# Patient Record
Sex: FEMALE | Race: BLACK OR AFRICAN AMERICAN | NOT HISPANIC OR LATINO | Employment: FULL TIME | URBAN - METROPOLITAN AREA
[De-identification: names, ages, dates, MRNs, and addresses within clinical notes are randomized per-mention and may not be internally consistent; named-entity substitution may affect disease eponyms.]

---

## 2019-07-07 ENCOUNTER — HOSPITAL ENCOUNTER (EMERGENCY)
Facility: HOSPITAL | Age: 49
Discharge: HOME | End: 2019-07-07
Attending: EMERGENCY MEDICINE
Payer: COMMERCIAL

## 2019-07-07 VITALS
BODY MASS INDEX: 27.32 KG/M2 | OXYGEN SATURATION: 99 % | RESPIRATION RATE: 18 BRPM | WEIGHT: 170 LBS | TEMPERATURE: 98 F | DIASTOLIC BLOOD PRESSURE: 81 MMHG | HEART RATE: 103 BPM | SYSTOLIC BLOOD PRESSURE: 139 MMHG | HEIGHT: 66 IN

## 2019-07-07 DIAGNOSIS — R10.12 LUQ PAIN: Primary | ICD-10-CM

## 2019-07-07 DIAGNOSIS — N63.0 BREAST MASS: ICD-10-CM

## 2019-07-07 LAB
ALBUMIN SERPL BCP-MCNC: 3.8 G/DL (ref 3.5–5.2)
ALP SERPL-CCNC: 131 U/L (ref 55–135)
ALT SERPL W/O P-5'-P-CCNC: 14 U/L (ref 10–44)
ANION GAP SERPL CALC-SCNC: 9 MMOL/L (ref 8–16)
AST SERPL-CCNC: 13 U/L (ref 10–40)
B-HCG UR QL: NEGATIVE
BASOPHILS # BLD AUTO: 0.01 K/UL (ref 0–0.2)
BASOPHILS NFR BLD: 0.2 % (ref 0–1.9)
BILIRUB SERPL-MCNC: 0.3 MG/DL (ref 0.1–1)
BILIRUB UR QL STRIP: NEGATIVE
BUN SERPL-MCNC: 10 MG/DL (ref 6–20)
CALCIUM SERPL-MCNC: 9.8 MG/DL (ref 8.7–10.5)
CHLORIDE SERPL-SCNC: 102 MMOL/L (ref 95–110)
CLARITY UR: CLEAR
CO2 SERPL-SCNC: 29 MMOL/L (ref 23–29)
COLOR UR: YELLOW
CREAT SERPL-MCNC: 0.6 MG/DL (ref 0.5–1.4)
CTP QC/QA: YES
DIFFERENTIAL METHOD: ABNORMAL
EOSINOPHIL # BLD AUTO: 0.3 K/UL (ref 0–0.5)
EOSINOPHIL NFR BLD: 5.2 % (ref 0–8)
ERYTHROCYTE [DISTWIDTH] IN BLOOD BY AUTOMATED COUNT: 13 % (ref 11.5–14.5)
EST. GFR  (AFRICAN AMERICAN): >60 ML/MIN/1.73 M^2
EST. GFR  (NON AFRICAN AMERICAN): >60 ML/MIN/1.73 M^2
GLUCOSE SERPL-MCNC: 93 MG/DL (ref 70–110)
GLUCOSE UR QL STRIP: NEGATIVE
HCT VFR BLD AUTO: 37.7 % (ref 37–48.5)
HGB BLD-MCNC: 11.9 G/DL (ref 12–16)
HGB UR QL STRIP: NEGATIVE
KETONES UR QL STRIP: NEGATIVE
LEUKOCYTE ESTERASE UR QL STRIP: NEGATIVE
LIPASE SERPL-CCNC: 8 U/L (ref 4–60)
LYMPHOCYTES # BLD AUTO: 2.9 K/UL (ref 1–4.8)
LYMPHOCYTES NFR BLD: 48.5 % (ref 18–48)
MCH RBC QN AUTO: 27.1 PG (ref 27–31)
MCHC RBC AUTO-ENTMCNC: 31.6 G/DL (ref 32–36)
MCV RBC AUTO: 86 FL (ref 82–98)
MONOCYTES # BLD AUTO: 0.4 K/UL (ref 0.3–1)
MONOCYTES NFR BLD: 7.2 % (ref 4–15)
NEUTROPHILS # BLD AUTO: 2.3 K/UL (ref 1.8–7.7)
NEUTROPHILS NFR BLD: 39.1 % (ref 38–73)
NITRITE UR QL STRIP: NEGATIVE
PH UR STRIP: 6 [PH] (ref 5–8)
PLATELET # BLD AUTO: 485 K/UL (ref 150–350)
PMV BLD AUTO: 9.1 FL (ref 9.2–12.9)
POTASSIUM SERPL-SCNC: 4.1 MMOL/L (ref 3.5–5.1)
PROT SERPL-MCNC: 8.3 G/DL (ref 6–8.4)
PROT UR QL STRIP: NEGATIVE
RBC # BLD AUTO: 4.39 M/UL (ref 4–5.4)
SODIUM SERPL-SCNC: 140 MMOL/L (ref 136–145)
SP GR UR STRIP: 1.02 (ref 1–1.03)
URN SPEC COLLECT METH UR: NORMAL
UROBILINOGEN UR STRIP-ACNC: NEGATIVE EU/DL
WBC # BLD AUTO: 5.94 K/UL (ref 3.9–12.7)

## 2019-07-07 PROCEDURE — 63600175 PHARM REV CODE 636 W HCPCS: Performed by: NURSE PRACTITIONER

## 2019-07-07 PROCEDURE — 96361 HYDRATE IV INFUSION ADD-ON: CPT

## 2019-07-07 PROCEDURE — 96372 THER/PROPH/DIAG INJ SC/IM: CPT | Mod: 59

## 2019-07-07 PROCEDURE — 99285 EMERGENCY DEPT VISIT HI MDM: CPT | Mod: 25

## 2019-07-07 PROCEDURE — 25500020 PHARM REV CODE 255: Performed by: EMERGENCY MEDICINE

## 2019-07-07 PROCEDURE — 96365 THER/PROPH/DIAG IV INF INIT: CPT

## 2019-07-07 PROCEDURE — 81025 URINE PREGNANCY TEST: CPT | Performed by: EMERGENCY MEDICINE

## 2019-07-07 PROCEDURE — 80053 COMPREHEN METABOLIC PANEL: CPT

## 2019-07-07 PROCEDURE — 83690 ASSAY OF LIPASE: CPT

## 2019-07-07 PROCEDURE — 81003 URINALYSIS AUTO W/O SCOPE: CPT

## 2019-07-07 PROCEDURE — 25000003 PHARM REV CODE 250: Performed by: NURSE PRACTITIONER

## 2019-07-07 PROCEDURE — 85025 COMPLETE CBC W/AUTO DIFF WBC: CPT

## 2019-07-07 PROCEDURE — 96375 TX/PRO/DX INJ NEW DRUG ADDON: CPT

## 2019-07-07 RX ORDER — ONDANSETRON 4 MG/1
4 TABLET, FILM COATED ORAL EVERY 8 HOURS PRN
Qty: 12 TABLET | Refills: 0 | Status: ON HOLD | OUTPATIENT
Start: 2019-07-07 | End: 2023-10-31

## 2019-07-07 RX ORDER — CYCLOBENZAPRINE HCL 5 MG
5 TABLET ORAL 3 TIMES DAILY PRN
Status: ON HOLD | COMMUNITY
End: 2023-10-31

## 2019-07-07 RX ORDER — ONDANSETRON 2 MG/ML
4 INJECTION INTRAMUSCULAR; INTRAVENOUS
Status: COMPLETED | OUTPATIENT
Start: 2019-07-07 | End: 2019-07-07

## 2019-07-07 RX ORDER — BUPRENORPHINE 2 MG/1
2 TABLET SUBLINGUAL DAILY
COMMUNITY
End: 2019-07-07

## 2019-07-07 RX ORDER — BUPRENORPHINE 2 MG/1
8 TABLET SUBLINGUAL DAILY
Status: ON HOLD | COMMUNITY
End: 2023-10-31

## 2019-07-07 RX ORDER — DICYCLOMINE HYDROCHLORIDE 10 MG/ML
20 INJECTION INTRAMUSCULAR
Status: COMPLETED | OUTPATIENT
Start: 2019-07-07 | End: 2019-07-07

## 2019-07-07 RX ORDER — DICYCLOMINE HYDROCHLORIDE 20 MG/1
20 TABLET ORAL
Qty: 28 TABLET | Refills: 0 | Status: SHIPPED | OUTPATIENT
Start: 2019-07-07 | End: 2019-07-14

## 2019-07-07 RX ORDER — ZOLPIDEM TARTRATE 5 MG/1
5 TABLET ORAL NIGHTLY PRN
Status: ON HOLD | COMMUNITY
End: 2023-10-31

## 2019-07-07 RX ADMIN — DICYCLOMINE HYDROCHLORIDE 20 MG: 20 INJECTION, SOLUTION INTRAMUSCULAR at 05:07

## 2019-07-07 RX ADMIN — ONDANSETRON 4 MG: 2 INJECTION INTRAMUSCULAR; INTRAVENOUS at 05:07

## 2019-07-07 RX ADMIN — SODIUM CHLORIDE 1000 ML: 0.9 INJECTION, SOLUTION INTRAVENOUS at 05:07

## 2019-07-07 RX ADMIN — PROMETHAZINE HYDROCHLORIDE 12.5 MG: 25 INJECTION INTRAMUSCULAR; INTRAVENOUS at 07:07

## 2019-07-07 RX ADMIN — IOHEXOL 80 ML: 350 INJECTION, SOLUTION INTRAVENOUS at 07:07

## 2019-07-07 NOTE — ED TRIAGE NOTES
Patient reports intermittent abdominal pain, n/v x several months. States she thinks it is related to medications. Denies fever, diarrhea, constipation, dysuria, hematuria, vaginal bleeding or discharge.

## 2019-07-08 NOTE — ED PROVIDER NOTES
Encounter Date: 2019       History     Chief Complaint   Patient presents with    Abdominal Pain     intermittnet n/v x4 days. generalized abd pain    Emesis     Chief complaint:  Abdominal pain    History of present illness:  Patient is a 48-year-old female reports 4 days of left upper quadrant abdominal pain that is intermittent.  She reports Emetrol and Dramamine have been ineffective for the relief of discomfort.  Current severity pain is 6/10.  She denies fever, chills, diarrhea, vaginal bleeding or discharge, urinary changes.  She endorses nausea and vomiting, constipation with last bowel movement 2 days ago and not notable for blood or bile.    The history is provided by the patient and medical records. No  was used.     Review of patient's allergies indicates:  No Known Allergies  Past Medical History:   Diagnosis Date    Insomnia      Past Surgical History:   Procedure Laterality Date     SECTION       History reviewed. No pertinent family history.  Social History     Tobacco Use    Smoking status: Current Every Day Smoker   Substance Use Topics    Alcohol use: No    Drug use: Not on file     Review of Systems   Constitutional: Negative for chills, fatigue and fever.   HENT: Negative for congestion, ear discharge, ear pain, postnasal drip, rhinorrhea, sinus pressure, sneezing, sore throat and voice change.    Eyes: Negative for discharge and itching.   Respiratory: Negative for cough, shortness of breath and wheezing.    Cardiovascular: Negative for chest pain, palpitations and leg swelling.   Gastrointestinal: Positive for abdominal pain, constipation, nausea and vomiting. Negative for diarrhea.   Endocrine: Negative for polydipsia, polyphagia and polyuria.   Genitourinary: Negative for dysuria, frequency, hematuria, urgency, vaginal bleeding, vaginal discharge and vaginal pain.   Musculoskeletal: Negative for arthralgias and myalgias.   Skin: Negative for rash and  wound.   Neurological: Negative for dizziness, seizures, syncope, weakness and numbness.   Hematological: Negative for adenopathy. Does not bruise/bleed easily.   Psychiatric/Behavioral: Negative for self-injury and suicidal ideas. The patient is not nervous/anxious.        Physical Exam     Initial Vitals [07/07/19 1631]   BP Pulse Resp Temp SpO2   130/80 96 18 98.3 °F (36.8 °C) 98 %      MAP       --         Physical Exam    Nursing note and vitals reviewed.  Constitutional: She appears well-developed and well-nourished.   HENT:   Head: Normocephalic and atraumatic.   Right Ear: External ear normal.   Left Ear: External ear normal.   Nose: Nose normal.   Eyes: Conjunctivae and EOM are normal. Pupils are equal, round, and reactive to light. Right eye exhibits no discharge. Left eye exhibits no discharge.   Neck: Normal range of motion.   Abdominal: Soft. Normal appearance and bowel sounds are normal. She exhibits no distension. There is no tenderness.   Musculoskeletal: Normal range of motion.   Neurological: She is alert and oriented to person, place, and time.   Skin: Skin is dry. Capillary refill takes less than 2 seconds.         ED Course   Procedures  Labs Reviewed   CBC W/ AUTO DIFFERENTIAL - Abnormal; Notable for the following components:       Result Value    Hemoglobin 11.9 (*)     Mean Corpuscular Hemoglobin Conc 31.6 (*)     Platelets 485 (*)     MPV 9.1 (*)     Lymph% 48.5 (*)     All other components within normal limits   COMPREHENSIVE METABOLIC PANEL   URINALYSIS, REFLEX TO URINE CULTURE   LIPASE   POCT URINE PREGNANCY          Imaging Results           CT Abdomen Pelvis With Contrast (Final result)  Result time 07/07/19 19:13:52    Final result by Leia Arroyo MD (07/07/19 19:13:52)                 Impression:      1. No acute intra-abdominal abnormalities identified.  2. Multi fibroid uterus.  3. Small nonobstructing right renal stone.  4. **Left breast 1.6 cm nodular lesion.  Future  mammographic follow-up is recommended. **  This report was flagged in Epic as abnormal.      Electronically signed by: Leia Arroyo MD  Date:    07/07/2019  Time:    19:13             Narrative:    EXAMINATION:  CT ABDOMEN PELVIS WITH CONTRAST    CLINICAL HISTORY:  LLQ pain, suspect diverticulitis;    TECHNIQUE:  Low dose axial images, sagittal and coronal reformations were obtained from the lung bases to the pubic symphysis following the IV administration of 80 mL of Omnipaque 350 .  Oral contrast was not given.    COMPARISON:  None.    FINDINGS:  The visualized portion of the heart is unremarkable.  The lung bases are clear.    No significant hepatic abnormalities are identified.  Liver is mildly enlarged measuring 19 cm.  There is no intra-or extrahepatic biliary ductal dilatation.  Gallbladder is contracted.  The stomach, pancreas, spleen, and adrenal glands are unremarkable.    Kidneys enhance normally with no evidence of hydronephrosis.  Small nonobstructing stone is seen within the right kidney.  No abnormalities are appreciated along the ureteral courses.  Urinary bladder is unremarkable.  Uterus is lobular in contour with multiple suspected fibroids seen.    Appendix is visualized and is unremarkable.  The visualized loops of small and large bowel show no evidence of obstruction or inflammation.  No free air or free fluid.    Aorta tapers normally.    No acute osseous abnormality identified. There is a 1.6 cm soft tissue nodule seen within left breast.                                 Medical Decision Making:   Initial Assessment:   48-year-old female with left lower quadrant abdominal pain, nausea and vomiting and constipation.  Differential Diagnosis:   Small-bowel obstruction, constipation, gastroenteritis, gastroparesis.  ED Management:  Initial orders include: CBC, CMP, Lipase, POCT UPT and UA.  CT Abd pel with iv contrast was also ordered as was bentyl 20mg im, ns1L IV, zofran 4mg ivp.                      ED Course as of Jul 07 2005   Sun Jul 07, 2019   1743 Preg Test, Ur: Negative [VC]   1808 CBC: leukocyte count was normal, the H&H was reduced. The platelet count was increased. This indicates mild anemia.       CBC auto differential(!) [VC]   1848 The chemistry was negative for hypo-or hyper natremia, kalemia, chloridemia, or other electrolyte abnormalities; BUN and creatinine were within normal limits indicating normal kidney function, ALT and AST were within normal limits indicating normal liver function, there was no transaminitis.       Comprehensive metabolic panel [VC]   1848 Lipase was within normal limits indicating unlikely pancreatitis or congestive obstruction of the hepatobiliary tree.     Lipase [VC]   1848 Awaiting for disposition.   Urinalysis, Reflex to Urine Culture [VC]   1926 1. No acute intra-abdominal abnormalities identified.  2. Multi fibroid uterus.  3. Small nonobstructing right renal stone.  4. **Left breast 1.6 cm nodular lesion.  Future mammographic follow-up is recommended. **  This report was flagged in Epic as abnormal.   CT Abdomen Pelvis With Contrast(!) [VC]   1954 UA is negative for infection, no nitrites, leukocytes, blood, or protein present.     Urinalysis, Reflex to Urine Culture [VC]      ED Course User Index  [VC] Truman Guzman DNP     Clinical Impression:       ICD-10-CM ICD-9-CM   1. LUQ pain R10.12 789.02   2. Breast mass N63.0 611.72     Bentyl and zofran prescribed for pain relief.  I doubt sbo.  There may be constipation.  Pt should eat bland diet, f/u with gastro referral provided.  Symptomatic therapies and return precautions on AVS.   Medication choices were made after reviewing allergies, medications, history, available laboratories.     Disposition:   Disposition: Discharged  Condition: Stable                        Truman Guzman DNP  07/07/19 2005

## 2019-08-04 ENCOUNTER — HOSPITAL ENCOUNTER (EMERGENCY)
Facility: HOSPITAL | Age: 49
Discharge: HOME | End: 2019-08-05
Attending: EMERGENCY MEDICINE
Payer: COMMERCIAL

## 2019-08-04 DIAGNOSIS — H81.12 BENIGN PAROXYSMAL POSITIONAL VERTIGO OF LEFT EAR: Primary | ICD-10-CM

## 2019-08-04 LAB
ALBUMIN SERPL-MCNC: 4 G/DL (ref 3.3–5.5)
ALBUMIN SERPL-MCNC: 4.4 G/DL (ref 3.3–5.5)
ALP SERPL-CCNC: 108 U/L (ref 42–141)
ALP SERPL-CCNC: 112 U/L (ref 42–141)
B-HCG UR QL: NEGATIVE
BILIRUB SERPL-MCNC: 0.5 MG/DL (ref 0.2–1.6)
BILIRUB SERPL-MCNC: 0.8 MG/DL (ref 0.2–1.6)
BILIRUBIN, POC UA: NEGATIVE
BLOOD, POC UA: NEGATIVE
BUN SERPL-MCNC: 6 MG/DL (ref 7–22)
CALCIUM SERPL-MCNC: 10.3 MG/DL (ref 8–10.3)
CHLORIDE SERPL-SCNC: 105 MMOL/L (ref 98–108)
CLARITY, POC UA: CLEAR
COLOR, POC UA: ABNORMAL
CREAT SERPL-MCNC: 0.4 MG/DL (ref 0.6–1.2)
CTP QC/QA: YES
GLUCOSE SERPL-MCNC: 119 MG/DL (ref 73–118)
GLUCOSE, POC UA: NEGATIVE
KETONES, POC UA: ABNORMAL
LEUKOCYTE EST, POC UA: NEGATIVE
NITRITE, POC UA: NEGATIVE
PH UR STRIP: 5.5 [PH]
POC ALT (SGPT): 16 U/L (ref 10–47)
POC ALT (SGPT): 19 U/L (ref 10–47)
POC AMYLASE: 29 U/L (ref 14–97)
POC AST (SGOT): 34 U/L (ref 11–38)
POC AST (SGOT): 35 U/L (ref 11–38)
POC GGT: 27 U/L (ref 5–65)
POC TCO2: 24 MMOL/L (ref 18–33)
POTASSIUM BLD-SCNC: 3.9 MMOL/L (ref 3.6–5.1)
PROTEIN, POC UA: ABNORMAL
PROTEIN, POC: 9.1 G/DL (ref 6.4–8.1)
PROTEIN, POC: 9.5 G/DL (ref 6.4–8.1)
SODIUM BLD-SCNC: 142 MMOL/L (ref 128–145)
SPECIFIC GRAVITY, POC UA: >=1.03
UROBILINOGEN, POC UA: 0.2 E.U./DL

## 2019-08-04 PROCEDURE — 63600175 PHARM REV CODE 636 W HCPCS: Mod: ER | Performed by: EMERGENCY MEDICINE

## 2019-08-04 PROCEDURE — 82150 ASSAY OF AMYLASE: CPT | Mod: ER

## 2019-08-04 PROCEDURE — 96361 HYDRATE IV INFUSION ADD-ON: CPT | Mod: ER

## 2019-08-04 PROCEDURE — 96375 TX/PRO/DX INJ NEW DRUG ADDON: CPT | Mod: ER

## 2019-08-04 PROCEDURE — 80053 COMPREHEN METABOLIC PANEL: CPT | Mod: ER

## 2019-08-04 PROCEDURE — 85025 COMPLETE CBC W/AUTO DIFF WBC: CPT | Mod: ER

## 2019-08-04 PROCEDURE — 99284 EMERGENCY DEPT VISIT MOD MDM: CPT | Mod: 25,ER

## 2019-08-04 PROCEDURE — 81025 URINE PREGNANCY TEST: CPT | Mod: ER | Performed by: EMERGENCY MEDICINE

## 2019-08-04 PROCEDURE — P9612 CATHETERIZE FOR URINE SPEC: HCPCS | Mod: ER

## 2019-08-04 PROCEDURE — 81003 URINALYSIS AUTO W/O SCOPE: CPT | Mod: ER

## 2019-08-04 PROCEDURE — 96374 THER/PROPH/DIAG INJ IV PUSH: CPT | Mod: ER

## 2019-08-04 RX ORDER — MECLIZINE HYDROCHLORIDE 25 MG/1
25 TABLET ORAL
Status: COMPLETED | OUTPATIENT
Start: 2019-08-05 | End: 2019-08-05

## 2019-08-04 RX ORDER — ONDANSETRON 2 MG/ML
8 INJECTION INTRAMUSCULAR; INTRAVENOUS
Status: COMPLETED | OUTPATIENT
Start: 2019-08-04 | End: 2019-08-04

## 2019-08-04 RX ORDER — METOCLOPRAMIDE HYDROCHLORIDE 5 MG/ML
10 INJECTION INTRAMUSCULAR; INTRAVENOUS
Status: COMPLETED | OUTPATIENT
Start: 2019-08-04 | End: 2019-08-04

## 2019-08-04 RX ADMIN — SODIUM CHLORIDE 1000 ML: 0.9 INJECTION, SOLUTION INTRAVENOUS at 10:08

## 2019-08-04 RX ADMIN — METOCLOPRAMIDE 10 MG: 5 INJECTION, SOLUTION INTRAMUSCULAR; INTRAVENOUS at 10:08

## 2019-08-04 RX ADMIN — ONDANSETRON 8 MG: 2 INJECTION INTRAMUSCULAR; INTRAVENOUS at 10:08

## 2019-08-04 RX ADMIN — LORAZEPAM 1 MG: 2 INJECTION INTRAMUSCULAR; INTRAVENOUS at 11:08

## 2019-08-05 VITALS
BODY MASS INDEX: 26.84 KG/M2 | TEMPERATURE: 98 F | WEIGHT: 167 LBS | RESPIRATION RATE: 18 BRPM | OXYGEN SATURATION: 100 % | SYSTOLIC BLOOD PRESSURE: 148 MMHG | DIASTOLIC BLOOD PRESSURE: 70 MMHG | HEIGHT: 66 IN | HEART RATE: 98 BPM

## 2019-08-05 PROCEDURE — 25000003 PHARM REV CODE 250: Mod: ER | Performed by: EMERGENCY MEDICINE

## 2019-08-05 RX ORDER — DIAZEPAM 5 MG/1
5 TABLET ORAL EVERY 12 HOURS
Qty: 4 TABLET | Refills: 0 | Status: ON HOLD | OUTPATIENT
Start: 2019-08-05 | End: 2023-11-11 | Stop reason: HOSPADM

## 2019-08-05 RX ORDER — MECLIZINE HYDROCHLORIDE 25 MG/1
25 TABLET ORAL 3 TIMES DAILY PRN
Qty: 20 TABLET | Refills: 0 | Status: ON HOLD | OUTPATIENT
Start: 2019-08-05 | End: 2023-11-11 | Stop reason: HOSPADM

## 2019-08-05 RX ADMIN — MECLIZINE HYDROCHLORIDE 25 MG: 25 TABLET ORAL at 12:08

## 2019-08-05 NOTE — ED PROVIDER NOTES
"Encounter Date: 2019    SCRIBE #1 NOTE: I, Taylor Rogers, am scribing for, and in the presence of,  Dr. Hill. I have scribed the following portions of the note - Other sections scribed: HPI, ROS, PE.       History     Chief Complaint   Patient presents with    Vomiting     pt reports she has had intermittent nausea and vomiting since 930 this morning. pt denies abdominal pain, chest pain, diarrhea or any other associated symptoms     Shellie Atkinson is a 48 y.o. female who presents to the ED complaining of acute intermittent N/V and dizziness since this morning. Pt describes dizziness as a "room spinning sensation" that began suddenly as she leaned her head back. She states symptoms are worse when she looks to the left. She denies focal weakness, gait disturbance, HA, vision disturbance, fever or abdominal pain.     The history is provided by the patient. No  was used.     Review of patient's allergies indicates:  No Known Allergies  Past Medical History:   Diagnosis Date    Insomnia      Past Surgical History:   Procedure Laterality Date     SECTION       History reviewed. No pertinent family history.  Social History     Tobacco Use    Smoking status: Current Every Day Smoker   Substance Use Topics    Alcohol use: No    Drug use: Not on file     Review of Systems   Constitutional: Negative for appetite change and fever.   Eyes: Negative for visual disturbance.   Gastrointestinal: Positive for nausea and vomiting. Negative for abdominal pain, constipation and diarrhea.   Musculoskeletal: Negative for gait problem.   Neurological: Positive for dizziness. Negative for speech difficulty, weakness, light-headedness, numbness and headaches.   All other systems reviewed and are negative.      Physical Exam     Initial Vitals [19 1912]   BP Pulse Resp Temp SpO2   (!) 166/93 94 18 98.6 °F (37 °C) 99 %      MAP       --         Physical Exam    Nursing note and vitals " reviewed.  Constitutional: She appears well-developed and well-nourished.   HENT:   Head: Normocephalic and atraumatic.   Eyes: Conjunctivae are normal.   Neck: Normal range of motion and phonation normal. Neck supple.   Cardiovascular: Normal rate and intact distal pulses.   Pulmonary/Chest: Effort normal. No stridor. No respiratory distress.   Musculoskeletal: Normal range of motion.   Neurological: She is alert and oriented to person, place, and time.   Negative hints exam.   Skin: Skin is warm and dry. Capillary refill takes less than 2 seconds. No rash noted.   Psychiatric: She has a normal mood and affect. Her behavior is normal.         ED Course   Procedures  Labs Reviewed   POCT URINALYSIS W/O SCOPE - Abnormal; Notable for the following components:       Result Value    Glucose, UA Negative (*)     Bilirubin, UA Negative (*)     Ketones, UA Trace (*)     Spec Grav UA >=1.030 (*)     Blood, UA Negative (*)     Protein, UA 1+ (*)     Nitrite, UA Negative (*)     Leukocytes, UA Negative (*)     All other components within normal limits   POCT CMP - Abnormal; Notable for the following components:    POC BUN 6 (*)     POC Creatinine 0.4 (*)     POC Glucose 119 (*)     Protein 9.1 (*)     All other components within normal limits   POCT LIVER PANEL - Abnormal; Notable for the following components:    Protein 9.5 (*)     All other components within normal limits   POCT URINE PREGNANCY   POCT URINALYSIS W/O SCOPE   POCT CBC   POCT CMP   POCT AMYLASE                Imaging Results    None          Medical Decision Making:   Clinical Tests:   Lab Tests: Reviewed and Ordered  ED Management:  12:23 AM Pt feeling much better, N/V has resolved.     Labs Reviewed  Admission on 08/04/2019, Discharged on 08/05/2019   Component Date Value Ref Range Status    POC Preg Test, Ur 08/04/2019 Negative  Negative Final     Acceptable 08/04/2019 Yes   Final    Glucose, UA 08/04/2019 Negative*  Final    Bilirubin,  UA 08/04/2019 Negative*  Final    Ketones, UA 08/04/2019 Trace*  Final    Spec Grav UA 08/04/2019 >=1.030*  Final    Blood, UA 08/04/2019 Negative*  Final    PH, UA 08/04/2019 5.5   Final    Protein, UA 08/04/2019 1+*  Final    Urobilinogen, UA 08/04/2019 0.2  E.U./dL Final    Nitrite, UA 08/04/2019 Negative*  Final    Leukocytes, UA 08/04/2019 Negative*  Final    Color, UA 08/04/2019 Simi   Final    Clarity, UA 08/04/2019 Clear   Final    Albumin, POC 08/04/2019 4.0  3.3 - 5.5 g/dL Final    Alkaline Phosphatase, POC 08/04/2019 112  42 - 141 U/L Final    ALT (SGPT), POC 08/04/2019 19  10 - 47 U/L Final    AST (SGOT), POC 08/04/2019 35  11 - 38 U/L Final    POC BUN 08/04/2019 6* 7 - 22 mg/dL Final    Calcium, POC 08/04/2019 10.3  8 - 10.3 mg/dL Final    POC Chloride 08/04/2019 105  98 - 108 mmol/L Final    POC Creatinine 08/04/2019 0.4* 0.6 - 1.2 mg/dL Final    POC Glucose 08/04/2019 119* 73 - 118 mg/dL Final    POC Potassium 08/04/2019 3.9  3.6 - 5.1 mmol/L Final    POC Sodium 08/04/2019 142  128 - 145 mmol/L Final    Bilirubin 08/04/2019 0.5  0.2 - 1.6 mg/dL Final    POC TCO2 08/04/2019 24  18 - 33 mmol/L Final    Protein 08/04/2019 9.1* 6.4 - 8.1 g/dL Final    Albumin, POC 08/04/2019 4.4  3.3 - 5.5 g/dL Final    Alkaline Phosphatase, POC 08/04/2019 108  42 - 141 U/L Final    ALT (SGPT), POC 08/04/2019 16  10 - 47 U/L Final    Amylase, POC 08/04/2019 29  14 - 97 U/L Final    AST (SGOT), POC 08/04/2019 34  11 - 38 U/L Final    POC GGT 08/04/2019 27  5 - 65 U/L Final    Bilirubin 08/04/2019 0.8  0.2 - 1.6 mg/dL Final    Protein 08/04/2019 9.5* 6.4 - 8.1 g/dL Final        Imaging Reviewed    Imaging Results    None         Medications given in ED    Medications   sodium chloride 0.9% bolus 1,000 mL (0 mLs Intravenous Stopped 8/5/19 0000)   ondansetron injection 8 mg (8 mg Intravenous Given 8/4/19 2205)   metoclopramide HCl injection 10 mg (10 mg Intravenous Given 8/4/19 2223)    lorazepam (ATIVAN) injection 1 mg (1 mg Intravenous Given 8/4/19 8857)   meclizine tablet 25 mg (25 mg Oral Given 8/5/19 0009)       This document was produced by a scribe under my direction and in my presence. I agree with the content of the note and have made any necessary edits.     Ramiro Hill MD         Note was created using voice recognition software. Note may have occasional typographical errors that may not have been identified and edited despite good yaquelin initial review prior to signing.            Scribe Attestation:   Scribe #1: I performed the above scribed service and the documentation accurately describes the services I performed. I attest to the accuracy of the note.      Discharge Medications     Discharge Medication List as of 8/5/2019 12:33 AM      START taking these medications    Details   diazePAM (VALIUM) 5 MG tablet Take 1 tablet (5 mg total) by mouth every 12 (twelve) hours. Do not drive while taking this medication for 2 days, Starting Mon 8/5/2019, Until Wed 8/7/2019, Print      meclizine (ANTIVERT) 25 mg tablet Take 1 tablet (25 mg total) by mouth 3 (three) times daily as needed for Dizziness or Nausea., Starting Mon 8/5/2019, Normal         CONTINUE these medications which have NOT CHANGED    Details   cyclobenzaprine (FLEXERIL) 5 MG tablet Take 5 mg by mouth 3 (three) times daily as needed for Muscle spasms., Historical Med      zolpidem (AMBIEN) 5 MG Tab Take 5 mg by mouth nightly as needed., Historical Med      buprenorphine HCl (SUBUTEX) 2 mg Subl Place 8 mg under the tongue once daily. 2/3 tab, Historical Med      ondansetron (ZOFRAN) 4 MG tablet Take 1 tablet (4 mg total) by mouth every 8 (eight) hours as needed for Nausea., Starting Sun 7/7/2019, Print                   Patient discharged to home in stable condition with instructions to:   1. Please take all meds as prescribed.  2. Follow-up with your primary care doctor   3. Return precautions discussed and patient and/or  family/caretaker understands to return to the emergency room for any concerns including worsening of your current symptoms, fever, chills, night sweats, worsening pain, chest pain, shortness of breath, nausea, vomiting, diarrhea, bleeding, headache, difficulty talking, visual disturbances, weakness, numbness or any other acute concerns             Clinical Impression:     1. Benign paroxysmal positional vertigo of left ear            Disposition:   Disposition: Discharged  Condition: Stable                        Ramiro Hill MD  08/09/19 8522

## 2019-08-05 NOTE — DISCHARGE INSTRUCTIONS
Watch instructional video on how to perform the Epley maneuver.  Repeat Epley maneuver as needed for recurrent vertigo.

## 2023-01-17 ENCOUNTER — HOSPITAL ENCOUNTER (EMERGENCY)
Facility: HOSPITAL | Age: 53
Discharge: STILL ADMITTED OR ADMINISTRATIVE | End: 2023-01-18
Attending: STUDENT IN AN ORGANIZED HEALTH CARE EDUCATION/TRAINING PROGRAM

## 2023-01-17 DIAGNOSIS — F22 PARANOIA: Primary | ICD-10-CM

## 2023-01-17 DIAGNOSIS — N30.01 ACUTE CYSTITIS WITH HEMATURIA: ICD-10-CM

## 2023-01-17 LAB
ALBUMIN SERPL BCP-MCNC: 4 G/DL (ref 3.5–5.2)
ALP SERPL-CCNC: 88 U/L (ref 55–135)
ALT SERPL W/O P-5'-P-CCNC: 11 U/L (ref 10–44)
AMPHET+METHAMPHET UR QL: NEGATIVE
ANION GAP SERPL CALC-SCNC: 8 MMOL/L (ref 8–16)
APAP SERPL-MCNC: <3 UG/ML (ref 10–20)
AST SERPL-CCNC: 13 U/L (ref 10–40)
B-HCG UR QL: NEGATIVE
BACTERIA #/AREA URNS HPF: ABNORMAL /HPF
BARBITURATES UR QL SCN>200 NG/ML: NEGATIVE
BASOPHILS # BLD AUTO: 0.05 K/UL (ref 0–0.2)
BASOPHILS NFR BLD: 0.7 % (ref 0–1.9)
BENZODIAZ UR QL SCN>200 NG/ML: NEGATIVE
BILIRUB SERPL-MCNC: 0.5 MG/DL (ref 0.1–1)
BILIRUB UR QL STRIP: NEGATIVE
BUN SERPL-MCNC: 7 MG/DL (ref 6–20)
BZE UR QL SCN: NEGATIVE
CALCIUM SERPL-MCNC: 10 MG/DL (ref 8.7–10.5)
CANNABINOIDS UR QL SCN: NEGATIVE
CHLORIDE SERPL-SCNC: 108 MMOL/L (ref 95–110)
CLARITY UR: ABNORMAL
CO2 SERPL-SCNC: 26 MMOL/L (ref 23–29)
COLOR UR: YELLOW
CREAT SERPL-MCNC: 0.8 MG/DL (ref 0.5–1.4)
CREAT UR-MCNC: 344.2 MG/DL (ref 15–325)
CTP QC/QA: YES
CTP QC/QA: YES
DIFFERENTIAL METHOD: NORMAL
EOSINOPHIL # BLD AUTO: 0.3 K/UL (ref 0–0.5)
EOSINOPHIL NFR BLD: 3.7 % (ref 0–8)
ERYTHROCYTE [DISTWIDTH] IN BLOOD BY AUTOMATED COUNT: 12.3 % (ref 11.5–14.5)
EST. GFR  (NO RACE VARIABLE): >60 ML/MIN/1.73 M^2
ETHANOL SERPL-MCNC: <10 MG/DL
GLUCOSE SERPL-MCNC: 112 MG/DL (ref 70–110)
GLUCOSE UR QL STRIP: NEGATIVE
HCT VFR BLD AUTO: 40.1 % (ref 37–48.5)
HGB BLD-MCNC: 13.1 G/DL (ref 12–16)
HGB UR QL STRIP: ABNORMAL
HYALINE CASTS #/AREA URNS LPF: ABNORMAL /LPF
IMM GRANULOCYTES # BLD AUTO: 0.01 K/UL (ref 0–0.04)
IMM GRANULOCYTES NFR BLD AUTO: 0.1 % (ref 0–0.5)
KETONES UR QL STRIP: NEGATIVE
LEUKOCYTE ESTERASE UR QL STRIP: ABNORMAL
LYMPHOCYTES # BLD AUTO: 3 K/UL (ref 1–4.8)
LYMPHOCYTES NFR BLD: 43.6 % (ref 18–48)
MCH RBC QN AUTO: 29.3 PG (ref 27–31)
MCHC RBC AUTO-ENTMCNC: 32.7 G/DL (ref 32–36)
MCV RBC AUTO: 90 FL (ref 82–98)
METHADONE UR QL SCN>300 NG/ML: NEGATIVE
MICROSCOPIC COMMENT: ABNORMAL
MONOCYTES # BLD AUTO: 0.5 K/UL (ref 0.3–1)
MONOCYTES NFR BLD: 6.5 % (ref 4–15)
NEUTROPHILS # BLD AUTO: 3.2 K/UL (ref 1.8–7.7)
NEUTROPHILS NFR BLD: 45.4 % (ref 38–73)
NITRITE UR QL STRIP: NEGATIVE
NRBC BLD-RTO: 0 /100 WBC
OPIATES UR QL SCN: NEGATIVE
PCP UR QL SCN>25 NG/ML: NEGATIVE
PH UR STRIP: 6 [PH] (ref 5–8)
PLATELET # BLD AUTO: 441 K/UL (ref 150–450)
PMV BLD AUTO: 9.5 FL (ref 9.2–12.9)
POTASSIUM SERPL-SCNC: 4.4 MMOL/L (ref 3.5–5.1)
PROT SERPL-MCNC: 8.7 G/DL (ref 6–8.4)
PROT UR QL STRIP: ABNORMAL
RBC # BLD AUTO: 4.47 M/UL (ref 4–5.4)
RBC #/AREA URNS HPF: 21 /HPF (ref 0–4)
SARS-COV-2 RDRP RESP QL NAA+PROBE: NEGATIVE
SODIUM SERPL-SCNC: 142 MMOL/L (ref 136–145)
SP GR UR STRIP: 1.02 (ref 1–1.03)
SQUAMOUS #/AREA URNS HPF: 9 /HPF
T4 FREE SERPL-MCNC: 1.42 NG/DL (ref 0.71–1.51)
TOXICOLOGY INFORMATION: ABNORMAL
TSH SERPL DL<=0.005 MIU/L-ACNC: 0.01 UIU/ML (ref 0.4–4)
URN SPEC COLLECT METH UR: ABNORMAL
UROBILINOGEN UR STRIP-ACNC: NEGATIVE EU/DL
WBC # BLD AUTO: 6.97 K/UL (ref 3.9–12.7)
WBC #/AREA URNS HPF: 18 /HPF (ref 0–5)

## 2023-01-17 PROCEDURE — 81000 URINALYSIS NONAUTO W/SCOPE: CPT | Mod: 59 | Performed by: EMERGENCY MEDICINE

## 2023-01-17 PROCEDURE — 80307 DRUG TEST PRSMV CHEM ANLYZR: CPT | Performed by: EMERGENCY MEDICINE

## 2023-01-17 PROCEDURE — 87635 SARS-COV-2 COVID-19 AMP PRB: CPT | Performed by: EMERGENCY MEDICINE

## 2023-01-17 PROCEDURE — G0425 INPT/ED TELECONSULT30: HCPCS | Mod: GT,,, | Performed by: PSYCHIATRY & NEUROLOGY

## 2023-01-17 PROCEDURE — 80053 COMPREHEN METABOLIC PANEL: CPT | Performed by: EMERGENCY MEDICINE

## 2023-01-17 PROCEDURE — 82077 ASSAY SPEC XCP UR&BREATH IA: CPT | Performed by: EMERGENCY MEDICINE

## 2023-01-17 PROCEDURE — G0425 PR INPT TELEHEALTH CONSULT 30M: ICD-10-PCS | Mod: GT,,, | Performed by: PSYCHIATRY & NEUROLOGY

## 2023-01-17 PROCEDURE — 84439 ASSAY OF FREE THYROXINE: CPT | Performed by: EMERGENCY MEDICINE

## 2023-01-17 PROCEDURE — 84443 ASSAY THYROID STIM HORMONE: CPT | Performed by: EMERGENCY MEDICINE

## 2023-01-17 PROCEDURE — 80143 DRUG ASSAY ACETAMINOPHEN: CPT | Performed by: EMERGENCY MEDICINE

## 2023-01-17 PROCEDURE — 85025 COMPLETE CBC W/AUTO DIFF WBC: CPT | Performed by: EMERGENCY MEDICINE

## 2023-01-17 PROCEDURE — 81025 URINE PREGNANCY TEST: CPT | Performed by: EMERGENCY MEDICINE

## 2023-01-17 PROCEDURE — 99285 EMERGENCY DEPT VISIT HI MDM: CPT | Mod: 25

## 2023-01-17 PROCEDURE — 87086 URINE CULTURE/COLONY COUNT: CPT | Performed by: EMERGENCY MEDICINE

## 2023-01-17 RX ORDER — NITROFURANTOIN 25; 75 MG/1; MG/1
100 CAPSULE ORAL EVERY 12 HOURS
Status: DISCONTINUED | OUTPATIENT
Start: 2023-01-18 | End: 2023-01-18

## 2023-01-17 NOTE — ED PROVIDER NOTES
"Encounter Date: 2023    SCRIBE #1 NOTE: I, Clinton West, am scribing for, and in the presence of,  Addie Zavala DO.     History     Chief Complaint   Patient presents with    Psychiatric Evaluation     Pt here for a psych eval .  Pt reports being stalked by an apostle x 1 month.  Denies SI, but would hurt the person stalking her.       51 y/o female with no PMHx was brought to ED by police for a psychiatric evaluation following domestic disturbances that the patient has caused over the past 2 weeks. Police note other recent bizarre behaviors, such as knocking on all her neighbors doors. Patient states that she is being stalked by kathie Li from [employer] in [place], which she previously attended; she believes he is staying in her building and this resulted in the previously mentioned domestic disturbances. She has called police for the stalking several times. Patient endorses homicidal thoughts directed at the stalker; she also endorses recent stress at work, resulting in her quitting. Patient also states her phone, e-mail, bank accounts have been hacked and she does not "trust" [employer]. She denies hallucinations or suicidal thoughts. Patient denies tobacco, EtOH, or drug use. No known allergies.     The history is provided by the patient and the police. No  was used.   Review of patient's allergies indicates:  No Known Allergies  Past Medical History:   Diagnosis Date    Insomnia      Past Surgical History:   Procedure Laterality Date     SECTION       No family history on file.  Social History     Tobacco Use    Smoking status: Every Day   Substance Use Topics    Alcohol use: No     Review of Systems   Constitutional:  Negative for chills and fever.   HENT:  Negative for drooling, facial swelling and trouble swallowing.    Eyes:  Negative for redness and visual disturbance.   Respiratory:  Negative for cough, shortness of breath and stridor.  "   Cardiovascular:  Negative for chest pain.   Gastrointestinal:  Negative for abdominal pain, nausea and vomiting.   Genitourinary:  Negative for dysuria and hematuria.   Musculoskeletal:  Negative for gait problem and neck stiffness.   Skin:  Negative for pallor and wound.   Neurological:  Negative for syncope, facial asymmetry, speech difficulty and weakness.   Psychiatric/Behavioral:  Positive for agitation and behavioral problems. Negative for confusion, hallucinations, self-injury and suicidal ideas.         (+) Homicidal thoughts   All other systems reviewed and are negative.    Physical Exam     Initial Vitals [01/17/23 1741]   BP Pulse Resp Temp SpO2   (!) 143/90 86 18 98.7 °F (37.1 °C) 100 %      MAP       --         Physical Exam    Nursing note and vitals reviewed.  Constitutional: She appears well-developed and well-nourished. She is not diaphoretic. No distress.   HENT:   Head: Normocephalic and atraumatic.   Right Ear: External ear normal.   Left Ear: External ear normal.   Nose: Nose normal.   Eyes: Conjunctivae are normal. No scleral icterus.   Neck: Neck supple. No tracheal deviation present.   Normal range of motion.  Cardiovascular:  Normal rate, regular rhythm and normal heart sounds.           Pulmonary/Chest: Breath sounds normal. No respiratory distress. She has no wheezes. She has no rhonchi. She has no rales.   Abdominal: Abdomen is soft. Bowel sounds are normal. There is no abdominal tenderness.   Musculoskeletal:      Cervical back: Normal range of motion and neck supple.     Neurological: She is alert and oriented to person, place, and time.   Skin: Skin is warm and dry.   Psychiatric: She has a normal mood and affect. Thought content is paranoid. She expresses homicidal ideation.       ED Course   Procedures  Labs Reviewed   COMPREHENSIVE METABOLIC PANEL - Abnormal; Notable for the following components:       Result Value    Glucose 112 (*)     Total Protein 8.7 (*)     All other  components within normal limits   TSH - Abnormal; Notable for the following components:    TSH 0.012 (*)     All other components within normal limits   ACETAMINOPHEN LEVEL - Abnormal; Notable for the following components:    Acetaminophen (Tylenol), Serum <3.0 (*)     All other components within normal limits   CBC W/ AUTO DIFFERENTIAL   ALCOHOL,MEDICAL (ETHANOL)   T4, FREE   URINALYSIS, REFLEX TO URINE CULTURE   DRUG SCREEN PANEL, URINE EMERGENCY   SARS-COV-2 RDRP GENE   POCT URINE PREGNANCY          Imaging Results    None          Medications - No data to display  Medical Decision Making:   Clinical Tests:   Lab Tests: Ordered and Reviewed  ED Management:   [facility]  This is an emergent evaluation of a 52 y.o. female with no significant past medical history presents with paranoid behavior. Initial vitals in the ED  [01/17/23 1741]    BP: (!) 143/90  Pulse: 86  Resp: 18  Temp: 98.7 °F (37.1 °C)  SpO2: 100 % .    Physical exam noted above. DDx includes but is not limited to acute psychosis, substance induced psychosis, electrolyte abnormality, thyroid disorder, acute infection. Also considered but clinically less likely to be stroke, meningitis, acute intracranial hemorrhage. Will obtain labs and imaging including  CBC, CMP, TSH, urine drug screen, UA, Tylenol level, ethanol level. Will also provide tele psych consult. Will continue to monitor and frequently reassess pending results of labs, treatments and final disposition.     Patient is aware of plan and is amenable.     Addie Zavala D.O  EMERGENCY MEDICINE  6:11 PM 01/17/2023    UPDATE:  Labs reveal a decreased TSH with normal free T4.  Remainder of labs unremarkable.  UA and urine drug screen pending.  Patient was evaluated by tele psychiatry, Dr. Saba, who recommended that patient be PEC for grave disability and placed in an inpatient psychiatric facility.  Patient is aware of plan.  Will plan to transport to psychiatric facility once patient  medically clear.  Will sign patient out to Dr. Cowan pending medical clearance.  This chart was completed using dictation software, as a result there may be some transcription errors          Scribe Attestation:   Scribe #1: I performed the above scribed service and the documentation accurately describes the services I performed. I attest to the accuracy of the note.            I, Addie Zavala DO , personally performed the services described in this documentation.  All medical record entries made by the scribe were at my direction and in my presence.  I have reviewed the chart and agree that the record reflects my personal performance and is accurate and complete.         Clinical Impression:   Final diagnoses:  [F22] Paranoia (Primary)        ED Disposition Condition    Transfer to Psych Facility Stable          ED Prescriptions    None       Follow-up Information    None          Addie Zavala DO  01/17/23 3652

## 2023-01-18 VITALS
RESPIRATION RATE: 18 BRPM | WEIGHT: 160 LBS | BODY MASS INDEX: 25.82 KG/M2 | HEART RATE: 84 BPM | DIASTOLIC BLOOD PRESSURE: 81 MMHG | TEMPERATURE: 98 F | OXYGEN SATURATION: 98 % | SYSTOLIC BLOOD PRESSURE: 135 MMHG

## 2023-01-18 PROCEDURE — 25000003 PHARM REV CODE 250: Performed by: INTERNAL MEDICINE

## 2023-01-18 RX ORDER — NITROFURANTOIN 25; 75 MG/1; MG/1
100 CAPSULE ORAL EVERY 12 HOURS
Status: DISCONTINUED | OUTPATIENT
Start: 2023-01-18 | End: 2023-01-18 | Stop reason: HOSPADM

## 2023-01-18 RX ADMIN — NITROFURANTOIN (MONOHYDRATE/MACROCRYSTALS) 100 MG: 75; 25 CAPSULE ORAL at 04:01

## 2023-01-18 NOTE — ED NOTES
Pt transferred to [facility] at this time. This RN called receiving unit to notify of pt's ETA to arrive.

## 2023-01-18 NOTE — ED NOTES
Pec faxed and scanned into patients chart. Registration scanned pec into patients chart. Spoke with Ahsan at the coroners office to notify him of pec.

## 2023-01-18 NOTE — CONSULTS
[facility]  Psychiatry  Telepsychiatry Consult Note    Please see previous notes:    Patient agreeable to consultation via telepsychiatry.    Tele-Consultation from Psychiatry started: 1/17/2023 at 6:00 PM  The chief complaint leading to psychiatric consultation is: Delusions  This consultation was requested by Dr. Cruz, the Emergency Department attending physician.  The location of the consulting psychiatrist is [place], [place].  The patient location is  [facility] EMERGENCY DEPARTMENT   The patient arrived at the ED at: 5:57 PM    Also present with the patient at the time of the consultation: Nursing staff    Patient Identification:   Shellie Atkinson is a 52 y.o. female.    Patient information was obtained from patient.  Patient presented involuntarily to the Emergency Department by police    Inpatient consult to Telemedicine - Psychiatry  Consult performed by: Shayne Saba DO  Consult ordered by: Celestino Gutiérrez MD      Teleconsult Time Documentation  Subjective:     History of Present Illness:  Per ED DO:   Psychiatric Evaluation        Pt here for a psych eval .  Pt reports being stalked by an apostle x 1 month.  Denies SI, but would hurt the person stalking her.        51 y/o female with no PMHx was brought to ED by police for a psychiatric evaluation following domestic disturbances that the patient has caused over the past 2 weeks. Police note other recent bizarre behaviors, such as knocking on all her neighbors doors. Patient states that she is being stalked by Pastor Radha Li from [employer] in [place], which she previously attended; she believes he is staying in her building and this resulted in the previously mentioned domestic disturbances. She has called police for the stalking several times. Patient endorses homicidal thoughts directed at the stalker; she also endorses recent stress at work, resulting in her quitting. Patient also states her phone,  "e-mail, bank accounts have been hacked and she does not "trust" WiFi. She denies hallucinations or suicidal thoughts. Patient denies tobacco, EtOH, or drug use. No known allergies.     On Interview:  Patient seen through teleconference this evening on my approach. She reports that she was brought to the hospital earlier today by the police. The police were concerned about the patient's behavior because she has been knocking on her neighbors doors. She states that she was knocking on their doors because she believed that a  that was stalking her was in the house. She has knocked on this particular door 3-4 times and she mentions doing this because she feels like he is accessing her wifi and finding different ways to interfere with her life. She is also concerned that he has been trying to access her bank account.     Psychiatric History:   Previous Psychiatric Hospitalizations: No  Previous Medication Trials: Yes   Previous Suicide Attempts: no   History of Violence: No  History of Depression: Yes  History of Domonique: No  History of Auditory/Visual Hallucination No  History of Delusions: Unclear  Outpatient psychiatrist (current & past): No    Substance Abuse History:  Tobacco:No  Alcohol: No  Illicit Substances:No  Detox/Rehab: No    Legal History: Past charges/incarcerations: Yes over 25 years ago    Family Psychiatric History: No      Social History:  Developmental/Childhood:Achieved all developmental milestones timely  Education: Some college  Employment Status/Finances:Unemployed within the past month  Relationship Status/Sexual Orientation:   Children: 2  Housing Status: Home alone   history:  NO  Access to gun: NO  Jew:Actively participates in organized Mormon  Recreational activities: Writing  Patient was born and raised in [place]     Psychiatric Mental Status Exam:  Arousal: alert  Sensorium/Orientation: oriented to grossly intact  Behavior/Cooperation: cooperative   Speech: " normal tone, normal rate, normal pitch, normal volume  Language: grossly intact  Mood: good   Affect: strange  Thought Process: ilogical  Thought Content:   Auditory hallucinations: NO  Visual hallucinations: NO  Paranoia: YES:      Delusions:  YES:      Suicidal ideation: NO  Homicidal ideation: NO  Attention/Concentration:  intact  Memory:    Recent:  Intact   Remote: Intact  Fund of Knowledge: Aware of current events   Abstract reasoning: proverbs were abstract, similarities were abstract  Insight: poor awareness of illness  Judgment: Poor      Past Medical History:   Past Medical History:   Diagnosis Date    Insomnia       Laboratory Data:   Labs Reviewed   COMPREHENSIVE METABOLIC PANEL - Abnormal; Notable for the following components:       Result Value    Glucose 112 (*)     Total Protein 8.7 (*)     All other components within normal limits   ACETAMINOPHEN LEVEL - Abnormal; Notable for the following components:    Acetaminophen (Tylenol), Serum <3.0 (*)     All other components within normal limits   CBC W/ AUTO DIFFERENTIAL   TSH   URINALYSIS, REFLEX TO URINE CULTURE   DRUG SCREEN PANEL, URINE EMERGENCY   ALCOHOL,MEDICAL (ETHANOL)   SARS-COV-2 RDRP GENE   POCT URINE PREGNANCY       Neurological History:  Seizures: No  Head trauma: No    Allergies:   Review of patient's allergies indicates:  No Known Allergies    Medications in ER: Medications - No data to display    Medications at home:     No new subjective & objective note has been filed under this hospital service since the last note was generated.      Assessment - Diagnosis - Goals:     Diagnosis/Impression: Patient is a 52 year old woman with no documented past psychiatric history who presented to the ED with bizarre delusions that a  has been stalking her. The patient has been harassing her neighbors and going to their homes assuming the  is in the house. The police have been called multiple times about this and brought the patient to the  hospital today because of the complaint.    Rec: Recommend [facility] as the patient is currently a gravely disabled secondary to psychiatric illness. Recommend involuntary placement in an inpatient psychiatric facility once the patient is medically stable.      Time with patient: 20 minutes      More than 50% of the time was spent counseling/coordinating care    Consulting clinician was informed of the encounter and consult note.    Consultation ended: 1/17/2023 at 6:20 PM    Shayne Saba,    Psychiatry  [facility]

## 2023-01-18 NOTE — ED NOTES
Handoff report taken from NATANAEL Lima. Assumed care at this time. Awaiting transport for pt to receiving facility.

## 2023-01-18 NOTE — ED TRIAGE NOTES
Pt BIB [facility] police, with [facility] for bizarre behavior. Per police pt has been paranoid and banging on neighbors doors. Upon arrival on scene, per police, pt was able to be redirected and remained calm and cooperative to ED. Per police pt was stating her neighbor is stalking her. Upon arrival to ED, pt states her neighbor is stalking and is trying to hack her phone, emails and bank account. Pt states that she is frustrated with him. Pt states that her neighbor, who is her ex  has been coming to her former job trying to get her to re-join the [employer]. Pt believes her neighbor is attempting to access her wifi because she see's wifi on her phone. Pt denies SI/HI/AH/VH at present. Pt calm and cooperative and is willing to speak with psychiatrist. Dr. Cruz at bedside. Sitter and security at bedside. Will continue to monitor.

## 2023-01-18 NOTE — ED NOTES
[facility]. [facility] notified of pt transfer, spoke with Charlotte

## 2023-01-19 LAB — BACTERIA UR CULT: NO GROWTH

## 2023-10-30 ENCOUNTER — HOSPITAL ENCOUNTER (EMERGENCY)
Facility: HOSPITAL | Age: 53
Discharge: PSYCHIATRIC OR SUBSTANCE USE FACILITY | End: 2023-10-31
Attending: EMERGENCY MEDICINE
Payer: MEDICAID

## 2023-10-30 DIAGNOSIS — E87.6 HYPOKALEMIA: ICD-10-CM

## 2023-10-30 DIAGNOSIS — F22 DELUSIONS: ICD-10-CM

## 2023-10-30 DIAGNOSIS — F22 PARANOIA: Primary | ICD-10-CM

## 2023-10-30 LAB
ALBUMIN SERPL BCP-MCNC: 3.7 G/DL (ref 3.5–5.2)
ALP SERPL-CCNC: 88 U/L (ref 55–135)
ALT SERPL W/O P-5'-P-CCNC: 8 U/L (ref 10–44)
AMPHET+METHAMPHET UR QL: NEGATIVE
ANION GAP SERPL CALC-SCNC: 12 MMOL/L (ref 8–16)
AST SERPL-CCNC: 11 U/L (ref 10–40)
BACTERIA #/AREA URNS HPF: ABNORMAL /HPF
BARBITURATES UR QL SCN>200 NG/ML: NEGATIVE
BASOPHILS # BLD AUTO: 0.05 K/UL (ref 0–0.2)
BASOPHILS NFR BLD: 0.8 % (ref 0–1.9)
BENZODIAZ UR QL SCN>200 NG/ML: NEGATIVE
BILIRUB SERPL-MCNC: 0.5 MG/DL (ref 0.1–1)
BILIRUB UR QL STRIP: NEGATIVE
BUN SERPL-MCNC: 8 MG/DL (ref 6–20)
BZE UR QL SCN: NEGATIVE
CALCIUM SERPL-MCNC: 9.4 MG/DL (ref 8.7–10.5)
CANNABINOIDS UR QL SCN: NEGATIVE
CHLORIDE SERPL-SCNC: 109 MMOL/L (ref 95–110)
CLARITY UR: ABNORMAL
CO2 SERPL-SCNC: 24 MMOL/L (ref 23–29)
COLOR UR: YELLOW
CREAT SERPL-MCNC: 0.7 MG/DL (ref 0.5–1.4)
CREAT UR-MCNC: 155.5 MG/DL (ref 15–325)
DIFFERENTIAL METHOD: ABNORMAL
EOSINOPHIL # BLD AUTO: 0.3 K/UL (ref 0–0.5)
EOSINOPHIL NFR BLD: 3.8 % (ref 0–8)
ERYTHROCYTE [DISTWIDTH] IN BLOOD BY AUTOMATED COUNT: 13.2 % (ref 11.5–14.5)
EST. GFR  (NO RACE VARIABLE): >60 ML/MIN/1.73 M^2
ETHANOL SERPL-MCNC: <10 MG/DL
GLUCOSE SERPL-MCNC: 109 MG/DL (ref 70–110)
GLUCOSE UR QL STRIP: NEGATIVE
HCT VFR BLD AUTO: 37.4 % (ref 37–48.5)
HGB BLD-MCNC: 12.3 G/DL (ref 12–16)
HGB UR QL STRIP: ABNORMAL
IMM GRANULOCYTES # BLD AUTO: 0.01 K/UL (ref 0–0.04)
IMM GRANULOCYTES NFR BLD AUTO: 0.2 % (ref 0–0.5)
KETONES UR QL STRIP: NEGATIVE
LEUKOCYTE ESTERASE UR QL STRIP: ABNORMAL
LYMPHOCYTES # BLD AUTO: 3.3 K/UL (ref 1–4.8)
LYMPHOCYTES NFR BLD: 50.2 % (ref 18–48)
MCH RBC QN AUTO: 29.4 PG (ref 27–31)
MCHC RBC AUTO-ENTMCNC: 32.9 G/DL (ref 32–36)
MCV RBC AUTO: 89 FL (ref 82–98)
METHADONE UR QL SCN>300 NG/ML: NEGATIVE
MICROSCOPIC COMMENT: ABNORMAL
MONOCYTES # BLD AUTO: 0.5 K/UL (ref 0.3–1)
MONOCYTES NFR BLD: 7.3 % (ref 4–15)
NEUTROPHILS # BLD AUTO: 2.5 K/UL (ref 1.8–7.7)
NEUTROPHILS NFR BLD: 37.7 % (ref 38–73)
NITRITE UR QL STRIP: NEGATIVE
NRBC BLD-RTO: 0 /100 WBC
OPIATES UR QL SCN: NEGATIVE
PCP UR QL SCN>25 NG/ML: NEGATIVE
PH UR STRIP: 6 [PH] (ref 5–8)
PLATELET # BLD AUTO: 433 K/UL (ref 150–450)
PMV BLD AUTO: 9 FL (ref 9.2–12.9)
POTASSIUM SERPL-SCNC: 3.2 MMOL/L (ref 3.5–5.1)
PROT SERPL-MCNC: 7.7 G/DL (ref 6–8.4)
PROT UR QL STRIP: NEGATIVE
RBC # BLD AUTO: 4.19 M/UL (ref 4–5.4)
RBC #/AREA URNS HPF: 6 /HPF (ref 0–4)
SODIUM SERPL-SCNC: 145 MMOL/L (ref 136–145)
SP GR UR STRIP: 1.01 (ref 1–1.03)
SQUAMOUS #/AREA URNS HPF: 7 /HPF
TOXICOLOGY INFORMATION: NORMAL
URN SPEC COLLECT METH UR: ABNORMAL
UROBILINOGEN UR STRIP-ACNC: NEGATIVE EU/DL
WBC # BLD AUTO: 6.59 K/UL (ref 3.9–12.7)
WBC #/AREA URNS HPF: 26 /HPF (ref 0–5)

## 2023-10-30 PROCEDURE — 84439 ASSAY OF FREE THYROXINE: CPT | Performed by: EMERGENCY MEDICINE

## 2023-10-30 PROCEDURE — 80143 DRUG ASSAY ACETAMINOPHEN: CPT | Performed by: EMERGENCY MEDICINE

## 2023-10-30 PROCEDURE — 85025 COMPLETE CBC W/AUTO DIFF WBC: CPT | Performed by: EMERGENCY MEDICINE

## 2023-10-30 PROCEDURE — 80307 DRUG TEST PRSMV CHEM ANLYZR: CPT | Performed by: EMERGENCY MEDICINE

## 2023-10-30 PROCEDURE — 84443 ASSAY THYROID STIM HORMONE: CPT | Performed by: EMERGENCY MEDICINE

## 2023-10-30 PROCEDURE — 99285 EMERGENCY DEPT VISIT HI MDM: CPT

## 2023-10-30 PROCEDURE — 81000 URINALYSIS NONAUTO W/SCOPE: CPT | Performed by: EMERGENCY MEDICINE

## 2023-10-30 PROCEDURE — 87086 URINE CULTURE/COLONY COUNT: CPT | Performed by: EMERGENCY MEDICINE

## 2023-10-30 PROCEDURE — 80053 COMPREHEN METABOLIC PANEL: CPT | Performed by: EMERGENCY MEDICINE

## 2023-10-30 PROCEDURE — 82077 ASSAY SPEC XCP UR&BREATH IA: CPT | Performed by: EMERGENCY MEDICINE

## 2023-10-31 ENCOUNTER — HOSPITAL ENCOUNTER (INPATIENT)
Facility: HOSPITAL | Age: 53
LOS: 11 days | Discharge: HOME | DRG: 885 | End: 2023-11-11
Attending: PSYCHIATRY & NEUROLOGY | Admitting: PSYCHIATRY & NEUROLOGY
Payer: MEDICAID

## 2023-10-31 VITALS
TEMPERATURE: 99 F | WEIGHT: 156 LBS | RESPIRATION RATE: 18 BRPM | HEIGHT: 66 IN | BODY MASS INDEX: 25.07 KG/M2 | HEART RATE: 68 BPM | OXYGEN SATURATION: 100 % | DIASTOLIC BLOOD PRESSURE: 76 MMHG | SYSTOLIC BLOOD PRESSURE: 165 MMHG

## 2023-10-31 DIAGNOSIS — F22 PARANOID: Primary | ICD-10-CM

## 2023-10-31 LAB
APAP SERPL-MCNC: <3 UG/ML (ref 10–20)
B-HCG UR QL: NEGATIVE
CTP QC/QA: YES
T4 FREE SERPL-MCNC: 1.25 NG/DL (ref 0.71–1.51)
TSH SERPL DL<=0.005 MIU/L-ACNC: 0.32 UIU/ML (ref 0.4–4)

## 2023-10-31 PROCEDURE — S4991 NICOTINE PATCH NONLEGEND: HCPCS | Performed by: PSYCHIATRY & NEUROLOGY

## 2023-10-31 PROCEDURE — 11400000 HC PSYCH PRIVATE ROOM

## 2023-10-31 PROCEDURE — 81025 URINE PREGNANCY TEST: CPT | Performed by: EMERGENCY MEDICINE

## 2023-10-31 PROCEDURE — 99205 PR OFFICE/OUTPT VISIT, NEW, LEVL V, 60-74 MIN: ICD-10-PCS | Mod: AF,HB,95, | Performed by: PSYCHIATRY & NEUROLOGY

## 2023-10-31 PROCEDURE — 99205 OFFICE O/P NEW HI 60 MIN: CPT | Mod: AF,HB,95, | Performed by: PSYCHIATRY & NEUROLOGY

## 2023-10-31 PROCEDURE — 90833 PSYTX W PT W E/M 30 MIN: CPT | Mod: AF,HB,, | Performed by: PSYCHIATRY & NEUROLOGY

## 2023-10-31 PROCEDURE — 25000003 PHARM REV CODE 250: Performed by: PSYCHIATRY & NEUROLOGY

## 2023-10-31 PROCEDURE — 99222 PR INITIAL HOSPITAL CARE,LEVL II: ICD-10-PCS | Mod: AF,HB,, | Performed by: PSYCHIATRY & NEUROLOGY

## 2023-10-31 PROCEDURE — 25000003 PHARM REV CODE 250: Performed by: EMERGENCY MEDICINE

## 2023-10-31 PROCEDURE — 99222 1ST HOSP IP/OBS MODERATE 55: CPT | Mod: AF,HB,, | Performed by: PSYCHIATRY & NEUROLOGY

## 2023-10-31 PROCEDURE — 90833 PR PSYCHOTHERAPY W/PATIENT W/E&M, 30 MIN (ADD ON): ICD-10-PCS | Mod: AF,HB,, | Performed by: PSYCHIATRY & NEUROLOGY

## 2023-10-31 RX ORDER — OLANZAPINE 10 MG/2ML
10 INJECTION, POWDER, FOR SOLUTION INTRAMUSCULAR EVERY 8 HOURS PRN
Status: CANCELLED | OUTPATIENT
Start: 2023-10-31

## 2023-10-31 RX ORDER — HYDROXYZINE PAMOATE 50 MG/1
50 CAPSULE ORAL EVERY 6 HOURS PRN
Status: DISCONTINUED | OUTPATIENT
Start: 2023-10-31 | End: 2023-11-11 | Stop reason: HOSPADM

## 2023-10-31 RX ORDER — OLANZAPINE 10 MG/1
10 TABLET ORAL EVERY 8 HOURS PRN
Status: CANCELLED | OUTPATIENT
Start: 2023-10-31

## 2023-10-31 RX ORDER — HYDROXYZINE PAMOATE 50 MG/1
50 CAPSULE ORAL EVERY 6 HOURS PRN
Status: CANCELLED | OUTPATIENT
Start: 2023-10-31

## 2023-10-31 RX ORDER — POTASSIUM CHLORIDE 20 MEQ/1
40 TABLET, EXTENDED RELEASE ORAL
Status: COMPLETED | OUTPATIENT
Start: 2023-10-31 | End: 2023-10-31

## 2023-10-31 RX ORDER — LOPERAMIDE HYDROCHLORIDE 2 MG/1
2 CAPSULE ORAL
Status: CANCELLED | OUTPATIENT
Start: 2023-10-31

## 2023-10-31 RX ORDER — OLANZAPINE 10 MG/2ML
10 INJECTION, POWDER, FOR SOLUTION INTRAMUSCULAR EVERY 8 HOURS PRN
Status: DISCONTINUED | OUTPATIENT
Start: 2023-10-31 | End: 2023-11-11 | Stop reason: HOSPADM

## 2023-10-31 RX ORDER — IBUPROFEN 200 MG
1 TABLET ORAL DAILY PRN
Status: DISCONTINUED | OUTPATIENT
Start: 2023-10-31 | End: 2023-11-01

## 2023-10-31 RX ORDER — ACETAMINOPHEN 325 MG/1
650 TABLET ORAL EVERY 6 HOURS PRN
Status: CANCELLED | OUTPATIENT
Start: 2023-10-31

## 2023-10-31 RX ORDER — IBUPROFEN 200 MG
1 TABLET ORAL DAILY PRN
Status: CANCELLED | OUTPATIENT
Start: 2023-10-31

## 2023-10-31 RX ORDER — LOPERAMIDE HYDROCHLORIDE 2 MG/1
2 CAPSULE ORAL
Status: DISCONTINUED | OUTPATIENT
Start: 2023-10-31 | End: 2023-11-11 | Stop reason: HOSPADM

## 2023-10-31 RX ORDER — ONDANSETRON 4 MG/1
4 TABLET, ORALLY DISINTEGRATING ORAL EVERY 8 HOURS PRN
Status: CANCELLED | OUTPATIENT
Start: 2023-10-31

## 2023-10-31 RX ORDER — BENZONATATE 100 MG/1
100 CAPSULE ORAL 3 TIMES DAILY PRN
Status: DISCONTINUED | OUTPATIENT
Start: 2023-10-31 | End: 2023-11-11 | Stop reason: HOSPADM

## 2023-10-31 RX ORDER — MAG HYDROX/ALUMINUM HYD/SIMETH 200-200-20
30 SUSPENSION, ORAL (FINAL DOSE FORM) ORAL EVERY 6 HOURS PRN
Status: CANCELLED | OUTPATIENT
Start: 2023-10-31

## 2023-10-31 RX ORDER — ONDANSETRON 4 MG/1
4 TABLET, ORALLY DISINTEGRATING ORAL EVERY 8 HOURS PRN
Status: DISCONTINUED | OUTPATIENT
Start: 2023-10-31 | End: 2023-11-11 | Stop reason: HOSPADM

## 2023-10-31 RX ORDER — MAG HYDROX/ALUMINUM HYD/SIMETH 200-200-20
30 SUSPENSION, ORAL (FINAL DOSE FORM) ORAL EVERY 6 HOURS PRN
Status: DISCONTINUED | OUTPATIENT
Start: 2023-10-31 | End: 2023-11-11 | Stop reason: HOSPADM

## 2023-10-31 RX ORDER — BENZTROPINE MESYLATE 1 MG/ML
2 INJECTION, SOLUTION INTRAMUSCULAR; INTRAVENOUS EVERY 8 HOURS PRN
Status: DISCONTINUED | OUTPATIENT
Start: 2023-10-31 | End: 2023-11-11 | Stop reason: HOSPADM

## 2023-10-31 RX ORDER — PROMETHAZINE HYDROCHLORIDE 25 MG/1
25 TABLET ORAL EVERY 6 HOURS PRN
Status: DISCONTINUED | OUTPATIENT
Start: 2023-10-31 | End: 2023-11-11 | Stop reason: HOSPADM

## 2023-10-31 RX ORDER — ACETAMINOPHEN 325 MG/1
650 TABLET ORAL EVERY 6 HOURS PRN
Status: DISCONTINUED | OUTPATIENT
Start: 2023-10-31 | End: 2023-11-11 | Stop reason: HOSPADM

## 2023-10-31 RX ORDER — OLANZAPINE 10 MG/1
10 TABLET ORAL EVERY 8 HOURS PRN
Status: DISCONTINUED | OUTPATIENT
Start: 2023-10-31 | End: 2023-11-11 | Stop reason: HOSPADM

## 2023-10-31 RX ADMIN — POTASSIUM CHLORIDE 40 MEQ: 1500 TABLET, EXTENDED RELEASE ORAL at 12:10

## 2023-10-31 RX ADMIN — NICOTINE 1 PATCH: 14 PATCH, EXTENDED RELEASE TRANSDERMAL at 01:10

## 2023-10-31 NOTE — NURSING
52 y.o. black  female with a PMHx of paranoia, insomnia, who presents to the ED via police under [facility] for psychiatric evaluation. Patient comes under [facility] file out by her son. History provided by independent historian, patient's son, who reports patient has been stating that her neighbors and store owners have been doing Jehovah's witness on her and are out to get her. He further endorses she reported they are turning off her lights and wifi. He further endorses she has not been taking her psychiatric medication since she was last discharged for her psychiatric admission a year ago. He notes she has also has not been eating properly. Patient currently denies this, and reports she is concerned because her banking information was stolen. No alleviating or exacerbating factors noted. NKDA  Pt arrived to [facility] with [facility] at side. Pt was transported here per [facility].  Proscan performed per security prior to pt's arrival to unit with negative results.  Pt oriented to unit and unit routine.  Pt's belongings given to mht to be inventoried.  Pt served a lunch tray and consumed 75 percent. Admission paperwork explained to pt and signed.  See in chart.  No c/o si, hi or a v hallucinations.  Pt gravely disabled.  Pt has been noncompliant with meds for over a year  and told psychiatrist that she would rather not take meds at this time.  Pt aware that prn meds are readily available if needed.  Pt verbalized understanding.  Pt instructed to call for  any needs or concerns at all.  Verbalized understanding.  Will cont to monitor for safety.

## 2023-10-31 NOTE — ED NOTES
Assumed care of patient. Introduced self to patient. Patient verbalizes understanding of POC. Tearful but cooperative. Denies needs at this time. Sitter at bedside for 1:1 observation. Care ongoing.

## 2023-10-31 NOTE — ED NOTES
Spoke with Kim at  's Office to inform her of [facility] patient in Room ED 15. Paperwork has been scanned into system. [facility] has been faxed over to [facility] Office.

## 2023-10-31 NOTE — ED NOTES
Pt BIB [facility] officers w/[facility] for psych eval. Per [facility] placed by family, states pt has been having delusions that an old coworker has been placing Sikhism curses on her, has not been taking her medications for over 1 year, placed foil all over the vents in the house to block spirits, believes her Wifi is being hacked. Pt denies all of these accusations. States she has not said any of those things pt does admit she has not taken any medication since February. Denies SI/HI/AV/HV. Pt is calm and cooperative at this time. Aaox3, nadn. Resp eu

## 2023-10-31 NOTE — CONSULTS
RD received consult for new admit. Spoke with PCT and PCT stated pt has no dietary issues at this time. RD to sign off. Please consult if any nutrition/dietary issues arise.

## 2023-10-31 NOTE — H&P
"PSYCHIATRY INPATIENT ADMISSION NOTE - H & P      10/31/2023 1:15 PM   Shellie Atkinson   1970   [id number]         DATE OF ADMISSION: 10/31/2023  1:05 PM    SITE: [facility]    CURRENT LEGAL STATUS: PEC and/or CEC      HISTORY    CHIEF COMPLAINT   Shellie Atkinson is a 52 y.o. female with a past psychiatric history of psychosis and insomnia currently admitted to the inpatient unit with the following chief complaint: psychosis, "I'm not sure why I'm here."    HPI   The patient was seen and examined. The chart was reviewed.    The patient presented to the ER on 10/31/2023 . Per staff notes:  -Pt BIB [employer] w/[facility] for psych eval. Per [facility] placed by family, states pt has been having delusions that an old coworker has been placing Confucianism curses on her, has not been taking her medications for over 1 year, placed foil all over the vents in the house to block spirits, believes her Wifi is being hacked. Pt denies all of these accusations. States she has not said any of those things pt does admit she has not taken any medication since February. Denies SI/HI/AV/HV. Pt is calm and cooperative at this time  -Pt BIB [employer] [employer] with [facility] for psych eval, Pt has not been taking her medications or eating, not taking care of herself and unwilling to discuss with family  -Shellie Atkinson is a 52 y.o. female with a PMHx of paranoia, insomnia, who presents to the ED via police under [facility]PC for psychiatric evaluation. Patient comes under [facility]PC file out by her son. History provided by independent historian, patient's son, who reports patient has been stating that her neighbors and store owners have been doing Confucianism on her and are out to get her. He further endorses she reported they are turning off her lights and wifi. He further endorses she has not been taking her psychiatric medication since she was last discharged for her psychiatric admission a year ago. He notes she has also has not been eating properly. " "Patient currently denies this, and reports she is concerned because her banking information was stolen. No alleviating or exacerbating factors noted. NKDA.   -Patient is a 52yoF with unknown past psychiatric history who presented to the ED on OPC by son for paranoia. This morning there were difficulties with internet and the lights. Patient says that she was worried that her cell phone was hacked. Patient thinks that her battery drains too fast because she has spyware. Patient says that her accounts are compromised so she cannot apply for disability. Patient denies that she has covered. Patient does think in spiritual warfare. Patient is concerned that she could be labeled. Patient says she worked with [facility] [employer] team in the past. Patient says that they just want to be free of her. Patient says she does not take any medication. She sleeps ok.    Patient endorses "extremely happy and blessed" mood and endorses good sleep and appetite. Patient denies any sober period of sleep deficit associated with grandiosity/irritability, distractibility, impulsivity, racing thoughts, increased activity and talkativeness. The patient denies any current or history of SI/HI or any plan/intent to self-harm or harm others. Denies AH/VH, paranoia. No vocalized delusions.   Collateral:  Leandra Lambert, son  [phone]  Patient had moved in with her son October 10, 2022. Mother lost her housing; had an apartment in 's name. Since October 2022, patient has been living with son. Son knew that patient has been supposed to take medication. Patient has been lying to son. Patient has not been taking medication. Patient has been talking about someone she worked with in the past. She was saying that the woman put Restoration on her. Son has recordings of the conversations. Son travels for work so he does not see what is happening. Patient is not acting right. Patient has not been eating; patient said that she had been fasting. Patient " "blamed the refrigerator going out on the coworker, either Avis or Evangelina. Accuses the lady of following patient from other medication. This is to another extreme that she is pressed against this woman. Thinks the lights are being powered by the lady. Patient thinks the  is a warlock that interacts with her, too. Accusing the lady of spitting on the windows that was actually a splash of paint. Thinks she is fighting a force that is going against her will. Patient has been isolating herself. Son says that he has recordings.    The patient was medically cleared and admitted to the U.    The patient reports that she was having some stress over suspicious activity on her phone, "I woke up and they sent me here." She denied all symptoms as documented below; however, there are concerns that she is minimizing symptoms. She discussed narratives concerning for paranoid delusions.    She previously had a similar episode in 1/2023- she was admitted to a U and placed on medication. She is not currently on any medications.      Symptoms of Depression: diminished mood - No, loss of interest/anhedonia - No;  recurrent - No, >14 days - No, diminished energy - No, change in sleep - No, change in appetite - No, diminished concentration or cognition or indecisiveness - No, PMA/R -  No, excessive guilt or hopelessness or worthlessness - No, suicidal ideations - No    Changes in Sleep: trouble with initiation- No, maintenance, - No early morning awakening with inability to return to sleep - No, hypersomnolence - No    Suicidal- active/passive ideations - No, organized plans, future intentions - No    Homicidal ideations: active/passive ideations - No, organized plans, future intentions - No    Symptoms of psychosis: hallucinations - No, delusions - No, disorganized speech - No, disorganized behavior or abnormal motor behavior - No, or negative symptoms (diminshed emotional expression, avolition, anhedonia, alogia, asociality) " "- No, active phase symptoms >1 month - No, continuous signs of illness > 6 months - No, since onset of illness decreased level of functioning present - No    Symptoms of oswaldo or hypomania: elevated, expansive, or irritable mood with increased energy or activity - No; > 4 days - No,  >7 days - No; with inflated self-esteem or grandiosity - No, decreased need for sleep - No, increased rate of speech - No, FOI or racing thoughts - No, distractibility - No, increased goal directed activity or PMA - No, risky/disinhibited behavior - No    Symptoms of SHARMIN: excessive anxiety/worry/fear, more days than not, about numerous issues - No, ongoing for >6 months - No, difficult to control - No, with restlessness - No, fatigue - No, poor concentration - No, irritability - No, muscle tension - No, sleep disturbance - No; causes functionally impairing distress - No    Symptoms of Panic Disorder: recurrent panic attacks (palpitations/heart racing, sweating, shakiness, dyspnea, choking, chest pain/discomfort, Gi symptoms, dizzy/lightheadedness, hot/col flashes, paresthesias, derealization, fear of losing control or fear of dying or fear of "going crazy") - No, precipitated - No, un-precipitated - No, source of worry and/or behavioral changes secondary for 1 month or longer- No, agoraphobia - No    Symptoms of PTSD: h/o trauma exposure - No; re-experiencing/intrusive symptoms - No, avoidant behavior - No, 2 or more negative alterations in cognition or mood - No, 2 or more hyperarousal symptoms - No; with dissociative symptoms - No, ongoing for 1 or more  months - No    Symptoms of OCD: obsessions (recurrent thoughts/urges/images; intrusive and/or unwanted; uses other thoughts/actions to suppress) - No; compulsions (repetitive behaviors used to lower distress/anxiety/obsessions) - No, time-consuming (over 1 hour per day) or cause significant distress/impairment - - No    Symptoms of Anorexia: restriction of caloric intake leading to " significantly low body weight - No, intense fear of gaining weight or persistent behavior that interferes with weight gain even thought at a significantly low weight - No, disturbance in the way in which one's body weight or shape is experienced, undue influence of body weight or shape on self evaluation, or persistent lack of recognition of the seriousness of the current low body weight - No    Symptoms of Bulimia: recurrent episodes of binge eating (definitely larger amount  than what others would eat and lack of a sense of control over eating during episode) - No, recurrent inappropriate compensatory behaviors in order to prevent weight gain (fasting, medications, exercise, vomiting) - No, binges and compensatory behaviors both occur on average at least once a week for 3 months - No, self evaluations is unduly influenced by body shape/weight- - No    Symptoms of Binge eating: recurrent episodes of binge eating (definitely larger amount than what others would eat and lack of a sense of control over eating during episode) - No, 3 or more of following (eating much more rapidly, eating until uncomfortably full, large amounts when not hungry, eating alone because of embarrassed by how much,  feeling disgusted with oneself, depressed or very guilty afterward) - No, distress regarding binges - No, binges occur on average at least once a week for 3 months - No      Substance/s:  Taken in larger amounts or over longer periods than intended: No,  Persistent desire or unsuccessful attempts to cut down or stop: No,  Great deal of time spent seeking, using or recovering from: No,  Craving or strong desire to use: No,  Recurrent use despite failure to meet major role obligation: No,  Continued use despite persistent or recurrent social/interparsonal issues due to use: No,  Important social/work/recreational activities given up due to use: No,  Recurrent use in physically hazardous situations: No,  Continued use despite  knowledge of persistent physical or psychological problem: No,  Tolerance (either increased need or diminished effect): No,  UDS was negative;  was reviewed- no controlled substances prescribed       Psychotherapy:  Target symptoms: psychosis  Why chosen therapy is appropriate versus another modality: relevant to diagnosis, patient responds to this modality, evidence based practice  Outcome monitoring methods: self-report, observation  Therapeutic intervention type: insight oriented psychotherapy, behavior modifying psychotherapy, supportive psychotherapy, interactive psychotherapy  Topics discussed/themes: building skills sets for symptom management, symptom recognition  The patient's response to the intervention is reluctant. The patient's progress toward treatment goals is limited.   Duration of intervention: 16 minutes.      PAST PSYCHIATRIC HISTORY  Previous Psychiatric Hospitalizations: Yes  Previous SI/HI: No,  Previous Suicide Attempts: No,   Previous Medication Trials: Yes,  Psychiatric Care (current & past): Yes, no outpatient provider  History of Psychotherapy: No,  History of Violence: No,  History of sexual/physical abuse: No,    PAST MEDICAL & SURGICAL HISTORY   Past Medical History:   Diagnosis Date    Insomnia      Past Surgical History:   Procedure Laterality Date     SECTION       denied    CURRENT PSYCH MEDICATION REGIMEN   unknown  Current Medication side effects:  u/a  Current Medication compliance:  u/a    Previous psych meds trials  Yes- as below    Home Meds:   Prior to Admission medications    Medication Sig Start Date End Date Taking? Authorizing Provider   buprenorphine HCl (SUBUTEX) 2 mg Subl Place 8 mg under the tongue once daily. 2/3 tab    Provider, Historical   cyclobenzaprine (FLEXERIL) 5 MG tablet Take 5 mg by mouth 3 (three) times daily as needed for Muscle spasms.    Provider, Historical   diazePAM (VALIUM) 5 MG tablet Take 1 tablet (5 mg total) by mouth every 12  (twelve) hours. Do not drive while taking this medication for 2 days 8/5/19 8/7/19  Ramiro Hill MD   meclizine (ANTIVERT) 25 mg tablet Take 1 tablet (25 mg total) by mouth 3 (three) times daily as needed for Dizziness or Nausea. 8/5/19   Ramiro Hill MD   ondansetron (ZOFRAN) 4 MG tablet Take 1 tablet (4 mg total) by mouth every 8 (eight) hours as needed for Nausea. 7/7/19   Truman Guzman DNP   zolpidem (AMBIEN) 5 MG Tab Take 5 mg by mouth nightly as needed.    Provider, Historical         OTC Meds: none    Scheduled Meds:    PRN Meds:    Psychotherapeutics (From admission, onward)      None            ALLERGIES   Review of patient's allergies indicates:  No Known Allergies    NEUROLOGIC HISTORY  Seizures: No  Head trauma: No    SOCIAL HISTORY:  Developmental/Childhood:Achieved all developmental milestones timely; Patient was born and raised in [place]   Education:High School Diploma- some college  Employment Status/Finances:Unemployed   Relationship Status/Sexual Orientation:   Children: 2  Housing Status: Home-  with son and his girlfriend since February 2023   history:  NO   Access to Firearms: NO ;  Locked up? n/a  Latter-day:Actively participates in organized Orthodoxy  Recreational activities: writing     SUBSTANCE ABUSE HISTORY   Recreational Drugs:  denied    Use of Alcohol: denied  Rehab History:no   Tobacco Use:yes- vapes    LEGAL HISTORY:   Past charges/incarcerations: YES:    over 25 years ago  Pending charges:NO    FAMILY PSYCHIATRIC HISTORY   No family history on file.    denied       ROS  General ROS: negative  Ophthalmic ROS: negative  ENT ROS: negative  Allergy and Immunology ROS: negative  Hematological and Lymphatic ROS: negative  Endocrine ROS: negative  Respiratory ROS: no cough, shortness of breath, or wheezing  Cardiovascular ROS: no chest pain or dyspnea on exertion  Gastrointestinal ROS: no abdominal pain, change in bowel habits, or black or bloody  stools  Genito-Urinary ROS: no dysuria, trouble voiding, or hematuria  Musculoskeletal ROS: negative  Neurological ROS: no TIA or stroke symptoms  Dermatological ROS: negative        EXAMINATION    PHYSICAL EXAM  Reviewed note/exam by Dr. Wagner from 10/30/23 at 11:19 PM; Med consulted to assist with resources    VITALS   There were no vitals filed for this visit.     There is no height or weight on file to calculate BMI.  Initial Vitals [10/30/23 2250]   BP Pulse Resp Temp SpO2   (!) 144/82 71 16 97.8 °F (36.6 °C) 99 %         PAIN  0/10  Subjective report of pain matches objective signs and symptoms: Yes    LABORATORY DATA   Recent Results (from the past 72 hour(s))   Urinalysis, Reflex to Urine Culture Urine, Clean Catch    Collection Time: 10/30/23 11:13 PM    Specimen: Urine   Result Value Ref Range    Specimen UA Urine, Clean Catch     Color, UA Yellow Yellow, Straw, Simi    Appearance, UA Hazy (A) Clear    pH, UA 6.0 5.0 - 8.0    Specific Gravity, UA 1.015 1.005 - 1.030    Protein, UA Negative Negative    Glucose, UA Negative Negative    Ketones, UA Negative Negative    Bilirubin (UA) Negative Negative    Occult Blood UA 1+ (A) Negative    Nitrite, UA Negative Negative    Urobilinogen, UA Negative <2.0 EU/dL    Leukocytes, UA 3+ (A) Negative   Drug screen panel, emergency    Collection Time: 10/30/23 11:13 PM   Result Value Ref Range    Benzodiazepines Negative Negative    Methadone metabolites Negative Negative    Cocaine (Metab.) Negative Negative    Opiate Scrn, Ur Negative Negative    Barbiturate Screen, Ur Negative Negative    Amphetamine Screen, Ur Negative Negative    THC Negative Negative    Phencyclidine Negative Negative    Creatinine, Urine 155.5 15.0 - 325.0 mg/dL    Toxicology Information SEE COMMENT    Urinalysis Microscopic    Collection Time: 10/30/23 11:13 PM   Result Value Ref Range    RBC, UA 6 (H) 0 - 4 /hpf    WBC, UA 26 (H) 0 - 5 /hpf    Bacteria Occasional None-Occ /hpf    Squam  Epithel, UA 7 /hpf    Microscopic Comment SEE COMMENT    Urine culture    Collection Time: 10/30/23 11:13 PM    Specimen: Urine   Result Value Ref Range    Urine Culture, Routine No significant isolate to date    CBC auto differential    Collection Time: 10/30/23 11:22 PM   Result Value Ref Range    WBC 6.59 3.90 - 12.70 K/uL    RBC 4.19 4.00 - 5.40 M/uL    Hemoglobin 12.3 12.0 - 16.0 g/dL    Hematocrit 37.4 37.0 - 48.5 %    MCV 89 82 - 98 fL    MCH 29.4 27.0 - 31.0 pg    MCHC 32.9 32.0 - 36.0 g/dL    RDW 13.2 11.5 - 14.5 %    Platelets 433 150 - 450 K/uL    MPV 9.0 (L) 9.2 - 12.9 fL    Immature Granulocytes 0.2 0.0 - 0.5 %    Gran # (ANC) 2.5 1.8 - 7.7 K/uL    Immature Grans (Abs) 0.01 0.00 - 0.04 K/uL    Lymph # 3.3 1.0 - 4.8 K/uL    Mono # 0.5 0.3 - 1.0 K/uL    Eos # 0.3 0.0 - 0.5 K/uL    Baso # 0.05 0.00 - 0.20 K/uL    nRBC 0 0 /100 WBC    Gran % 37.7 (L) 38.0 - 73.0 %    Lymph % 50.2 (H) 18.0 - 48.0 %    Mono % 7.3 4.0 - 15.0 %    Eosinophil % 3.8 0.0 - 8.0 %    Basophil % 0.8 0.0 - 1.9 %    Differential Method Automated    Comprehensive metabolic panel    Collection Time: 10/30/23 11:22 PM   Result Value Ref Range    Sodium 145 136 - 145 mmol/L    Potassium 3.2 (L) 3.5 - 5.1 mmol/L    Chloride 109 95 - 110 mmol/L    CO2 24 23 - 29 mmol/L    Glucose 109 70 - 110 mg/dL    BUN 8 6 - 20 mg/dL    Creatinine 0.7 0.5 - 1.4 mg/dL    Calcium 9.4 8.7 - 10.5 mg/dL    Total Protein 7.7 6.0 - 8.4 g/dL    Albumin 3.7 3.5 - 5.2 g/dL    Total Bilirubin 0.5 0.1 - 1.0 mg/dL    Alkaline Phosphatase 88 55 - 135 U/L    AST 11 10 - 40 U/L    ALT 8 (L) 10 - 44 U/L    eGFR >60 >60 mL/min/1.73 m^2    Anion Gap 12 8 - 16 mmol/L   TSH    Collection Time: 10/30/23 11:22 PM   Result Value Ref Range    TSH 0.318 (L) 0.400 - 4.000 uIU/mL   Ethanol    Collection Time: 10/30/23 11:22 PM   Result Value Ref Range    Alcohol, Serum <10 <10 mg/dL   Acetaminophen level    Collection Time: 10/30/23 11:22 PM   Result Value Ref Range    Acetaminophen  "(Tylenol), Serum <3.0 (L) 10.0 - 20.0 ug/mL   T4, Free    Collection Time: 10/30/23 11:22 PM   Result Value Ref Range    Free T4 1.25 0.71 - 1.51 ng/dL   POCT urine pregnancy    Collection Time: 10/31/23  1:12 AM   Result Value Ref Range    POC Preg Test, Ur Negative Negative     Acceptable Yes       No results found for: "PHENYTOIN", "PHENOBARB", "VALPROATE", "CBMZ"        CONSTITUTIONAL  General Appearance: unremarkable, age appropriate    MUSCULOSKELETAL  Muscle Strength and Tone:no dyskinesia, no dystonia, no tremor, no tic  Abnormal Involuntary Movements: No  Gait and Station: non-ataxic    PSYCHIATRIC   Level of Consciousness: awake and alert   Orientation: person, place, time, and situation  Grooming: Hospital garb and Well groomed  Psychomotor Behavior: normal, cooperative, eye contact normal  Speech: normal tone, normal rate, normal pitch, normal volume, spontaneous  Language: grossly intact, able to name, able to repeat  Mood: anxious  Affect: Anxious, Consistent with mood, Congruent with thought, and Full  Thought Process: linear, illogical at times  Associations: intact   Thought Content: +delusions, denies SI, and denies HI  Perceptions: denies AH and denies  VH  Memory: Able to recall past events, Remote intact, and Recent intact  Attention:Normal and Attends to interview without distraction  Fund of Knowledge: Aware of current events and Vocabulary appropriate   Estimate if Intelligence:  Average based on work/education history, vocabulary and mental status exam  Insight: limited awareness of illness- fair  Judgment: behavior is adequate to circumstances      PSYCHOSOCIAL    PSYCHOSOCIAL STRESSORS   family and occupational    FUNCTIONING RELATIONSHIPS   good support system    STRENGTHS AND LIABILITIES   Strength: Patient accepts guidance/feedback, Strength: Patient has positive support network., Liability: Patient is unstable., Liability: Patient lacks coping skills.    Is the patient " aware of the biomedical complications associated with substance abuse and mental illness? yes    Does the patient have an Advance Directive for Mental Health treatment? no  (If yes, inform patient to bring copy.)        MEDICAL DECISION MAKING        ASSESSMENT     Schizophrenia, acute exacerbation, severe  Unspecified Anxiety Disorder    Psychosocial stressors    Nicotine Dependence       PROBLEM LIST AND MANAGEMENT PLANS    Psychosis: pt counseled  -she is currently refusing medications and denying symptoms    Anxiety: pt counseled  -vistaril prn  -uncertain if primary vs secondary to paranoia    Psychosocial stressors: pt counseled   -Sw consulted to assist with resources    Nicotine Dependence: pt counseled  -start nicotine 14 mg patch dermal q day       PRESCRIPTION DRUG MANAGEMENT  Compliance: no  Side Effects: no  Regimen Adjustments: see above    Discussed diagnosis, risks and benefits of proposed treatment vs alternative treatments vs no treatment, potential side effects of these treatments and the inherent unpredictability of treatment. The patient expresses understanding of the above and displays the capacity to agree with this treatment given said understanding. Patient also agrees that, currently, the benefits outweigh the risks and would like to pursue/continue treatment at this time.    Any medications being used off-label were discussed with the patient inclusive of the evidence base for the use of the medications and consent was obtained for the off-label use of the medication.       DIAGNOSTIC TESTING  Labs reviewed with patient; follow up pending labs    Disposition:  -Will attempt to obtain outside psychiatric records if available  -SW to assist with aftercare planning and collateral  -Once stable discharge home with outpatient follow up care and/or rehab  -Continue inpatient treatment under a PEC and/or CEC for danger to self/ danger to others/grave disability as evident by significant psychotic  thought disorder and aggressive neuroleptic titration        J Carlos Buckley MD  Psychiatry

## 2023-10-31 NOTE — NURSING
Report received.  Pt currently medically stable, calm and cooperative.   Consent was obtained from the patient. The risks and benefits to therapy were discussed in detail. Specifically, the risks of infection, scarring, bleeding, prolonged wound healing, incomplete removal, allergy to anesthesia, nerve injury and recurrence were addressed. Prior to the procedure, the treatment site was clearly identified and confirmed by the patient. All components of Universal Protocol/PAUSE Rule completed.

## 2023-10-31 NOTE — ED NOTES
SPD here to transport pt. Pt placed in the wheelchair. Pt transferred to vehicle with hospital security and tech at side. NAD Noted.

## 2023-10-31 NOTE — ED NOTES
Assumed care of pt from Teri SANTOYO. Pt resting supine in bed with eyes closed. Pt arouses to voice. Pt is calm and cooperative. Pt denies any complaints at this time. I notified pt of placement and she refused to notify family at this time. Pt denies any SI or HI. Pt denies any auditory or visual hallucinations. Sitter at bedside. Pt AAOx4, RESP easy and unlabored. Pt awaiting SPD transport.    Chief Complaint   Patient presents with   • Other     6 month follow-up       Subjective      Patient ID: Zaki Wilder is a 72 y.o. male.    Pt seen and examined for follow up of chronic conditions.  Denies changes since last visit.  Patient Active Problem List:     Mild intermittent asthma     Dermatitis     Penile lesion     Lumbar spondylosis        The following have been reviewed and updated as appropriate in this visit:        Review of Systems   Constitutional: Negative for appetite change and fatigue.   HENT: Negative for hearing loss and trouble swallowing.    Eyes: Negative for visual disturbance.   Respiratory: Negative for cough and shortness of breath.    Cardiovascular: Negative for chest pain and leg swelling.   Gastrointestinal: Negative for abdominal pain and constipation.   Genitourinary: Negative for difficulty urinating and dysuria.   Musculoskeletal: Negative for back pain and gait problem.   Skin: Negative for rash.   Neurological: Negative for weakness and headaches.   Psychiatric/Behavioral: Negative for sleep disturbance. The patient is not nervous/anxious.          Current Outpatient Prescriptions:   •  azelastine (ASTELIN) 137 mcg (0.1 %) nasal spray, Administer 1 spray into each nostril daily., Disp: 30 mL, Rfl: 3  •  clobetasol (TEMOVATE) 0.05 % cream, Apply to affected area as daily as needed, Disp: 45 g, Rfl: 1  •  fluocinonide (LIDEX) 0.05 % cream, Apply 1 application topically daily., Disp: 30 g, Rfl: 1  •  fluticasone propion-salmeterol (ADVAIR DISKUS) 100-50 mcg/dose diskus inhaler, Inhale 1 puff 2 (two) times a day., Disp: 3 each, Rfl: 3  •  fluticasone propionate (FLONASE) 50 mcg/actuation nasal spray, Administer 1 spray into each nostril daily., Disp: 1 Bottle, Rfl: 5  •  ketoconazole (NIZORAL) 2 % cream, Apply 2 % topically daily., Disp: , Rfl:   •  ketoconazole (NIZORAL) 2 % shampoo, Apply 1 application topically 2 (two) times a week (Mon, Thu)., Disp: 120 mL, Rfl: 0  •   mometasone (ELOCON) 0.1 % cream, Apply 1 application topically daily., Disp: 45 g, Rfl: 3  •  tacrolimus (PROTOPIC) 0.1 % ointment, Apply 0.1 % topically daily., Disp: , Rfl:     Objective     Vitals:    06/14/19 1422   BP: 130/80   Pulse: 66   Resp: 16   Temp: 36.4 °C (97.5 °F)   TempSrc: Oral   SpO2: 98%   Weight: 94.9 kg (209 lb 3.2 oz)     Physical Exam   Constitutional: He is oriented to person, place, and time. He appears well-developed and well-nourished.   HENT:   Head: Normocephalic.   Eyes: Pupils are equal, round, and reactive to light. EOM are normal.   Neck: Normal range of motion. Neck supple. No JVD present.   Cardiovascular: Normal rate and regular rhythm.    Pulmonary/Chest: Effort normal and breath sounds normal.   Abdominal: Soft. Bowel sounds are normal.   Musculoskeletal: Normal range of motion.   Neurological: He is alert and oriented to person, place, and time.   Skin: Skin is warm and dry.   Psychiatric: He has a normal mood and affect.         Assessment/Plan   Mild intermittent asthma  Well controlled with Advair    Lumbar spondylosis  Stable  Improved after physical therapy    Dermatitis  Stable, follow up with dermatology    Screening for cholesterol level  Check fasting lipids      Orders Placed This Encounter   Procedures   • CBC and Differential   • Comprehensive metabolic panel   • Lipid panel     New Medications Ordered This Visit   Medications   • fluticasone propion-salmeterol (ADVAIR DISKUS) 100-50 mcg/dose diskus inhaler     Sig: Inhale 1 puff 2 (two) times a day.     Dispense:  3 each     Refill:  3   • azelastine (ASTELIN) 137 mcg (0.1 %) nasal spray     Sig: Administer 1 spray into each nostril daily.     Dispense:  30 mL     Refill:  3       No Follow-up on file.     Chuck Ahuja, DO

## 2023-10-31 NOTE — ED NOTES
Patient transported with 1 personal belongings bag. Spoke with Chel at [facility] office to notify the transfer of the patient.

## 2023-10-31 NOTE — ED PROVIDER NOTES
Encounter Date: 10/30/2023    SCRIBE #1 NOTE: I, Pete Singh, am scribing for, and in the presence of,  Jose Wagner MD.       History     Chief Complaint   Patient presents with    Psychiatric Evaluation     Pt BIB [facility] with [facility] for psych eval, Pt has not been taking her medications or eating, not taking care of herself and unwilling to discuss with family     Shellie Atkinson is a 52 y.o. female with a PMHx of paranoia, insomnia, who presents to the ED via police under [facility] for psychiatric evaluation. Patient comes under [facility] file out by her son. History provided by independent historian, patient's son, who reports patient has been stating that her neighbors and store owners have been doing Episcop[employer] on her and are out to get her. He further endorses she reported they are turning off her lights and wifi. He further endorses she has not been taking her psychiatric medication since she was last discharged for her psychiatric admission a year ago. He notes she has also has not been eating properly. Patient currently denies this, and reports she is concerned because her banking information was stolen. No alleviating or exacerbating factors noted. NKDA.     The history is provided by the patient and a relative. No  was used.     Review of patient's allergies indicates:  No Known Allergies  Past Medical History:   Diagnosis Date    Insomnia      Past Surgical History:   Procedure Laterality Date     SECTION       History reviewed. No pertinent family history.  Social History     Tobacco Use    Smoking status: Every Day     Types: Vaping with nicotine   Substance Use Topics    Alcohol use: No    Drug use: Never     Review of Systems   Constitutional:  Negative for chills and fever.   HENT:  Negative for sore throat.    Respiratory:  Negative for cough and shortness of breath.    Cardiovascular:  Negative for chest pain.   Gastrointestinal:  Negative for nausea.   Genitourinary:   Negative for dysuria.   Musculoskeletal:  Negative for back pain.   Skin:  Negative for rash.   Neurological:  Negative for weakness.   Psychiatric/Behavioral:  Positive for behavioral problems. Negative for confusion.        Physical Exam     Initial Vitals [10/30/23 2250]   BP Pulse Resp Temp SpO2   (!) 144/82 71 16 97.8 °F (36.6 °C) 99 %      MAP       --         Physical Exam    Nursing note and vitals reviewed.  Constitutional: She appears well-developed and well-nourished. She is cooperative. She does not appear ill. No distress.   HENT:   Head: Normocephalic and atraumatic.   Mouth/Throat: Oropharynx is clear and moist.   Eyes: Conjunctivae and EOM are normal.   Neck:   Normal range of motion.  Cardiovascular:  Regular rhythm and normal heart sounds.   Tachycardia present.         No murmur heard.  Pulmonary/Chest: Breath sounds normal. No respiratory distress. She has no wheezes.   Abdominal: Abdomen is soft. There is no abdominal tenderness.   Musculoskeletal:         General: No edema.      Cervical back: Normal range of motion.     Neurological: She is alert. GCS score is 15.   Skin: Skin is warm.   Psychiatric: She has a normal mood and affect. Thought content is paranoid.   Calm.          ED Course   Procedures  Labs Reviewed   CBC W/ AUTO DIFFERENTIAL - Abnormal; Notable for the following components:       Result Value    MPV 9.0 (*)     Gran % 37.7 (*)     Lymph % 50.2 (*)     All other components within normal limits   COMPREHENSIVE METABOLIC PANEL - Abnormal; Notable for the following components:    Potassium 3.2 (*)     ALT 8 (*)     All other components within normal limits   TSH - Abnormal; Notable for the following components:    TSH 0.318 (*)     All other components within normal limits   URINALYSIS, REFLEX TO URINE CULTURE - Abnormal; Notable for the following components:    Appearance, UA Hazy (*)     Occult Blood UA 1+ (*)     Leukocytes, UA 3+ (*)     All other components within normal  limits    Narrative:     Specimen Source->Urine   ACETAMINOPHEN LEVEL - Abnormal; Notable for the following components:    Acetaminophen (Tylenol), Serum <3.0 (*)     All other components within normal limits   URINALYSIS MICROSCOPIC - Abnormal; Notable for the following components:    RBC, UA 6 (*)     WBC, UA 26 (*)     All other components within normal limits    Narrative:     Specimen Source->Urine   CULTURE, URINE   DRUG SCREEN PANEL, URINE EMERGENCY    Narrative:     Specimen Source->Urine   ALCOHOL,MEDICAL (ETHANOL)   T4, FREE          Imaging Results    None          Medications   potassium chloride SA CR tablet 40 mEq (40 mEq Oral Given 10/31/23 0049)     Medical Decision Making    52-year-old female presents paranoia and delusions.  Majority of history obtained from the patient's son.  Patient is denying on the claims.  Patient does have similar previous ER presentations.  Patient is calm cooperative at this time.  Per report the patient has not taken any medications.  Psychiatry was consulted.  Recommendation is to hold off on medications for the time being to see if the patient begins to have symptoms in the emergency room.  Otherwise patient is to stay on a pec.      Differential schizophrenia, toxidrome, manic episode    Amount and/or Complexity of Data Reviewed  Labs: ordered.    Risk  Prescription drug management.            Scribe Attestation:   Scribe #1: I performed the above scribed service and the documentation accurately describes the services I performed. I attest to the accuracy of the note.        ED Course as of 10/31/23 0055   Mon Oct 30, 2023   2313 Phone call, Cameron Lambert    States that the she believes that the store owner is doing voodo. States that she has not been taking her medicine for more than a year. Being solitary. Not eating. Has been ongoing about a year.  [JM]   e Oct 31, 2023   0053 Case discussed with Dr. Blakely, psychiatry    States no medication at this time to see if  symptoms appear.  [JM]      ED Course User Index  [JM] Jose Wagner MD       Medically cleared for psychiatry placement: 10/31/2023 12:39 AM            I, Jose Wagner, personally performed the services described in this documentation. All medical record entries made by the scribe were at my direction and in my presence. I have reviewed the chart and agree that the record reflects my personal performance and is accurate and complete.    Clinical Impression:   Final diagnoses:  [F22] Paranoia (Primary)  [F22] Delusions  [E87.6] Hypokalemia        ED Disposition Condition    Transfer to Psych Facility Stable          ED Prescriptions    None       Follow-up Information    None          Jose Wagner MD  10/31/23 0056

## 2023-10-31 NOTE — CONSULTS
"[facility]  Psychiatry  Telepsychiatry Consult Note    Please see previous notes:    Patient agreeable to consultation via telepsychiatry.    Tele-Consultation from Psychiatry started: 10/31/2023 at 0019  The chief complaint leading to psychiatric consultation is: paranoia  This consultation was requested by Dr. Jose Wagner, the Emergency Department attending physician.  The location of the consulting psychiatrist is  [place], [place] .  The patient location is  [facility] EMERGENCY DEPARTMENT   The patient arrived at the ED at: 2252    Also present with the patient at the time of the consultation: none    Patient Identification:   Shellie Atkinson is a 52 y.o. female.    Patient information was obtained from patient and relative(s).  Patient presented involuntarily to the Emergency Department on [facility]    Inpatient consult to Telemedicine - Psychiatry  Consult performed by: Mirna Rand MD  Consult ordered by: Jose Wagner MD  Reason for consult: paranoia        Consult Start Time: 10/31/2023 00:19 CDT  Consult End Time: 10/31/2023 00:57 CDT        Subjective:     History of Present Illness:  Patient is a 52yoF with unknown past psychiatric history who presented to the ED on [facility] by son for paranoia. This morning there were difficulties with internet and the lights. Patient says that she was worried that her cell phone was hacked. Patient thinks that her battery drains too fast because she has spyware. Patient says that her accounts are compromised so she cannot apply for disability. Patient denies that she has covered. Patient does think in spiritual warfare. Patient is concerned that she could be labeled. Patient says she worked with [facility] team in the past. Patient says that they just want to be free of her. Patient says she does not take any medication. She sleeps ok.     Patient endorses "extremely happy and blessed" mood and endorses good sleep and appetite. Patient denies any " sober period of sleep deficit associated with grandiosity/irritability, distractibility, impulsivity, racing thoughts, increased activity and talkativeness. The patient denies any current or history of SI/HI or any plan/intent to self-harm or harm others. Denies AH/VH, paranoia. No vocalized delusions.    Collateral:  Leandra Lambert, son  [phone]  Patient had moved in with her son October 10, 2022. Mother lost her housing; had an apartment in 's name. Since October 2022, patient has been living with son. Son knew that patient has been supposed to take medication. Patient has been lying to son. Patient has not been taking medication. Patient has been talking about someone she worked with in the past. She was saying that the woman put [employer] on her. Son has recordings of the conversations. Son travels for work so he does not see what is happening. Patient is not acting right. Patient has not been eating; patient said that she had been fasting. Patient blamed the refrigerator going out on the coworker, either Avis or Evangelina. Accuses the lady of following patient from other medication. This is to another extreme that she is pressed against this woman. Thinks the lights are being powered by the lady. Patient thinks the  is a warlock that interacts with her, too. Accusing the lady of spitting on the windows that was actually a splash of paint. Thinks she is fighting a force that is going against her will. Patient has been isolating herself. Son says that he has recordings.    Psychiatric History:   Previous Psychiatric Hospitalizations: yes, 1x 1/2023  Previous Medication Trials: Yes   Previous Suicide Attempts: no   History of Violence: No  History of Depression: Yes  History of Domonique: No  History of Auditory/Visual Hallucination No  History of Delusions: Unclear  Outpatient psychiatrist (current & past): No     Substance Abuse History:  Tobacco:No  Alcohol: No  Illicit Substances:No  Detox/Rehab: No    "  Legal History: Past charges/incarcerations: Yes over 25 years ago     Family Psychiatric History: No     Social History:  Developmental/Childhood:Achieved all developmental milestones timely  Education: Some college  Employment Status/Finances:Unemployed  Relationship Status/Sexual Orientation:   Children: 2  Housing Status: with son and his girlfriend since February 2023   history:  NO  Access to gun: NO  Faith:Actively participates in organized Jehovah's witness  Recreational activities: Writing  Patient was born and raised in [place]     Psychiatric Mental Status Exam:  Arousal: alert  Sensorium/Orientation: oriented to grossly intact  Behavior/Cooperation: reluctant to participate   Speech: normal tone, normal rate, normal pitch, normal volume  Language: grossly intact  Mood: " extremely happy and blessed "   Affect: irritable  Thought Process: slightly tangential  Thought Content:   Auditory hallucinations: NO  Visual hallucinations: NO  Paranoia: NO per patient; yes per son  Delusions:  YES: paranoid delusion that old coworker is messing with the electronics    Suicidal ideation: NO  Homicidal ideation: NO  Attention/Concentration:  intact  Memory:    Recent:  Intact   Remote: Intact  Insight: poor awareness of illness  Judgment: limited      Past Medical History:   Past Medical History:   Diagnosis Date    Insomnia       Laboratory Data:   Labs Reviewed   CBC W/ AUTO DIFFERENTIAL - Abnormal; Notable for the following components:       Result Value    MPV 9.0 (*)     Gran % 37.7 (*)     Lymph % 50.2 (*)     All other components within normal limits   COMPREHENSIVE METABOLIC PANEL - Abnormal; Notable for the following components:    Potassium 3.2 (*)     ALT 8 (*)     All other components within normal limits   URINALYSIS, REFLEX TO URINE CULTURE - Abnormal; Notable for the following components:    Appearance, UA Hazy (*)     Occult Blood UA 1+ (*)     Leukocytes, UA 3+ (*)     All other " components within normal limits    Narrative:     Specimen Source->Urine   ACETAMINOPHEN LEVEL - Abnormal; Notable for the following components:    Acetaminophen (Tylenol), Serum <3.0 (*)     All other components within normal limits   URINALYSIS MICROSCOPIC - Abnormal; Notable for the following components:    RBC, UA 6 (*)     WBC, UA 26 (*)     All other components within normal limits    Narrative:     Specimen Source->Urine   CULTURE, URINE   DRUG SCREEN PANEL, URINE EMERGENCY    Narrative:     Specimen Source->Urine   ALCOHOL,MEDICAL (ETHANOL)   TSH       Neurological History:  Seizures: No  Head trauma: No    Allergies:   Review of patient's allergies indicates:  No Known Allergies    Medications in ER: Medications - No data to display    Medications at home: no psych meds      Assessment - Diagnosis - Goals:     Diagnosis/Impression: Patient is a 52yoF with unknown past psychiatric history who presented on [facility] by son. Patient denies any concerns and says she does not need medications. Son says that paranoia has been gradually building, and today it came to a head. She has always been spiritual, but this is to a different level. He says that he has recordings that would emphasize the paranoia that the patient is presenting. For these reasons, will recommend inpatient psychiatric hospitalization.    Rec:   1. Dispo/Legal Status:  Cont PEC at this time as the pt is currently gravely disabled. Seek inpt bed for pt safety and stabilization when/if medically cleared by the ER MD.  2. Scheduled Medications: none. Defer any non-psych meds to the ER MD.   3. PRN Medications: olanzapine 10mg po/im q8hr prn non-redirectable agitation associated with breakthrough psychosis or oswaldo if needed to help the pt more effectively interact with environment.  4. Precautions/Nursing:  standard pec precautions  5. To-Do: Continue to observe pt's behavior while in the ER  6. Case Discussed with: Dr. Jose Wagner    Time with  patient: 19 minutes  In total, 38 minutes were spent on chart review, discussion with ED, patient interview and charting    More than 50% of the time was spent counseling/coordinating care    Consulting clinician was informed of the encounter and consult note.    Consultation ended: 10/31/2023 at 0057    Mirna Rand MD   Psychiatry  [facility]

## 2023-11-01 LAB
BACTERIA UR CULT: NORMAL
CHOLEST SERPL-MCNC: 231 MG/DL (ref 120–199)
CHOLEST/HDLC SERPL: 3 {RATIO} (ref 2–5)
ESTIMATED AVG GLUCOSE: 105 MG/DL (ref 68–131)
HBA1C MFR BLD: 5.3 % (ref 4–5.6)
HDLC SERPL-MCNC: 77 MG/DL (ref 40–75)
HDLC SERPL: 33.3 % (ref 20–50)
LDLC SERPL CALC-MCNC: 141.4 MG/DL (ref 63–159)
NONHDLC SERPL-MCNC: 154 MG/DL
TRIGL SERPL-MCNC: 63 MG/DL (ref 30–150)

## 2023-11-01 PROCEDURE — 83036 HEMOGLOBIN GLYCOSYLATED A1C: CPT | Performed by: PSYCHIATRY & NEUROLOGY

## 2023-11-01 PROCEDURE — 90833 PR PSYCHOTHERAPY W/PATIENT W/E&M, 30 MIN (ADD ON): ICD-10-PCS | Mod: AF,HB,, | Performed by: PSYCHIATRY & NEUROLOGY

## 2023-11-01 PROCEDURE — 80061 LIPID PANEL: CPT | Performed by: PSYCHIATRY & NEUROLOGY

## 2023-11-01 PROCEDURE — 36415 COLL VENOUS BLD VENIPUNCTURE: CPT | Performed by: PSYCHIATRY & NEUROLOGY

## 2023-11-01 PROCEDURE — 25000003 PHARM REV CODE 250: Performed by: PSYCHIATRY & NEUROLOGY

## 2023-11-01 PROCEDURE — 11400000 HC PSYCH PRIVATE ROOM

## 2023-11-01 PROCEDURE — 99232 PR SUBSEQUENT HOSPITAL CARE,LEVL II: ICD-10-PCS | Mod: AF,HB,, | Performed by: PSYCHIATRY & NEUROLOGY

## 2023-11-01 PROCEDURE — S4991 NICOTINE PATCH NONLEGEND: HCPCS | Performed by: PSYCHIATRY & NEUROLOGY

## 2023-11-01 PROCEDURE — 99232 SBSQ HOSP IP/OBS MODERATE 35: CPT | Mod: AF,HB,, | Performed by: PSYCHIATRY & NEUROLOGY

## 2023-11-01 PROCEDURE — 90833 PSYTX W PT W E/M 30 MIN: CPT | Mod: AF,HB,, | Performed by: PSYCHIATRY & NEUROLOGY

## 2023-11-01 RX ORDER — IBUPROFEN 200 MG
1 TABLET ORAL DAILY
Status: DISCONTINUED | OUTPATIENT
Start: 2023-11-01 | End: 2023-11-11 | Stop reason: HOSPADM

## 2023-11-01 RX ORDER — IBUPROFEN 200 MG
1 TABLET ORAL DAILY
Status: DISCONTINUED | OUTPATIENT
Start: 2023-11-02 | End: 2023-11-01

## 2023-11-01 RX ADMIN — NICOTINE 1 PATCH: 14 PATCH, EXTENDED RELEASE TRANSDERMAL at 10:11

## 2023-11-01 NOTE — H&P
[facility]) Hospital Medicine  History & Physical    Patient Name: Shellie Atkinson  MRN: [id number]  Patient Class: IP- Psych  Admission Date: 10/31/2023  Attending Physician: J Carlos Buckley MD   Primary Care Provider: [facility]         Patient information was obtained from ER records.     Subjective:     Principal Problem:Paranoid    Chief Complaint: No chief complaint on file.       HPI:   Chief Complaint   Patient presents with    Psychiatric Evaluation       Pt BIB [employer] [facility] with [facility] for psych eval, Pt has not been taking her medications or eating, not taking care of herself and unwilling to discuss with family      Shellie Atkinson is a 52 y.o. female with a PMHx of paranoia, insomnia, who presents to the ED via police under [facility] for psychiatric evaluation. Patient comes under [facility] file out by her son. History provided by independent historian, patient's son, who reports patient has been stating that her neighbors and store owners have been doing Mosque on her and are out to get her. He further endorses she reported they are turning off her lights and wifi. He further endorses she has not been taking her psychiatric medication since she was last discharged for her psychiatric admission a year ago. He notes she has also has not been eating properly. Patient currently denies this, and reports she is concerned because her banking information was stolen. No alleviating or exacerbating factors noted. NKDA.      The history is provided by the patient and a relative. No  was used.       Past Medical History:   Diagnosis Date    Insomnia        Past Surgical History:   Procedure Laterality Date     SECTION         Review of patient's allergies indicates:  No Known Allergies    No current facility-administered medications on file prior to encounter.     Current Outpatient Medications on File Prior to Encounter   Medication Sig     diazePAM (VALIUM) 5 MG tablet Take 1 tablet (5 mg total) by mouth every 12 (twelve) hours. Do not drive while taking this medication for 2 days    meclizine (ANTIVERT) 25 mg tablet Take 1 tablet (25 mg total) by mouth 3 (three) times daily as needed for Dizziness or Nausea.     Family History    None       Tobacco Use    Smoking status: Every Day     Types: Vaping with nicotine    Smokeless tobacco: Not on file   Substance and Sexual Activity    Alcohol use: No    Drug use: Never    Sexual activity: Yes     Partners: Male     Review of Systems   Constitutional:  Negative for fatigue and fever.   HENT:  Negative for congestion, ear pain and sore throat.    Eyes:  Negative for pain and discharge.   Respiratory:  Negative for cough, shortness of breath and wheezing.    Gastrointestinal:  Negative for abdominal pain, constipation, diarrhea, nausea and vomiting.   Endocrine: Negative for cold intolerance and heat intolerance.   Genitourinary:  Negative for difficulty urinating, dysuria and frequency.   Musculoskeletal:  Negative for arthralgias.   Allergic/Immunologic: Negative for environmental allergies.   Neurological:  Negative for dizziness, tremors and seizures.   Psychiatric/Behavioral:  Positive for behavioral problems and sleep disturbance.    All other systems reviewed and are negative.    Objective:     Vital Signs (Most Recent):  Temp: 98 °F (36.7 °C) (11/01/23 0731)  Pulse: 82 (11/01/23 0731)  Resp: 18 (11/01/23 0731)  BP: 125/72 (11/01/23 0731)  SpO2: (!) 94 % (11/01/23 0731) Vital Signs (24h Range):  Temp:  [98 °F (36.7 °C)-99.4 °F (37.4 °C)] 98 °F (36.7 °C)  Pulse:  [65-82] 82  Resp:  [18-20] 18  SpO2:  [94 %-100 %] 94 %  BP: (125-179)/(72-99) 125/72     Weight: 70.8 kg (156 lb 1.4 oz)  Body mass index is 25.19 kg/m².     Physical Exam  Vitals and nursing note reviewed.   Constitutional:       Appearance: Normal appearance.   HENT:      Head: Normocephalic and atraumatic.      Nose: Nose normal.       Mouth/Throat:      Mouth: Mucous membranes are moist.      Pharynx: Oropharynx is clear.   Eyes:      Extraocular Movements: Extraocular movements intact.      Conjunctiva/sclera: Conjunctivae normal.      Pupils: Pupils are equal, round, and reactive to light.   Cardiovascular:      Rate and Rhythm: Normal rate and regular rhythm.      Pulses: Normal pulses.      Heart sounds: Normal heart sounds.   Pulmonary:      Effort: Pulmonary effort is normal.      Breath sounds: Normal breath sounds.   Abdominal:      General: Abdomen is flat. Bowel sounds are normal.      Palpations: Abdomen is soft.   Musculoskeletal:         General: Normal range of motion.      Cervical back: Normal range of motion and neck supple.   Skin:     General: Skin is warm and dry.      Capillary Refill: Capillary refill takes less than 2 seconds.      Comments: No rashes on limited skin exam.   Neurological:      General: No focal deficit present.      Mental Status: She is alert and oriented to person, place, and time.      Cranial Nerves: No cranial nerve deficit.      Comments: I Olfactory:  Sense of smell intact    II Optic:  Pupils equal round react to light.  Vision intact.    III, IV, VI, Ocular motor, Trochlear, Abducens:  Extraocular movements intact    V Trigeminal:  Facial sensation intact facial sensation intact,, muscles of mastication intact muscles of mastication intact, corneal reflex intact, corneal reflex intact    VII Facial:  Muscles of facial expression intact     VIII Vestibular cochlear: Hearing intact vestibular cochlear: Hearing intact    IX Glossopharyngeal:  Gag reflex intact.  Tasting intact.     X Vagus:  Gag reflex intact.    XI Spinal Accessory:  Shoulder shrug intact.  Head rotation intact.    XII Hypoglossal:  Tongue movements intact.     Psychiatric:      Comments: Patient appears paranoid              CRANIAL NERVES     CN III, IV, VI   Pupils are equal, round, and reactive to light.       Significant Labs:  All pertinent labs within the past 24 hours have been reviewed.    Significant Imaging: I have reviewed all pertinent imaging results/findings within the past 24 hours.    Assessment/Plan:     * Paranoid  To be admitted to our inpatient psychiatric unit for further evaluation and management.          VTE Risk Mitigation (From admission, onward)    None                         Nj Mendez Jr, MD  Department of Hospital Medicine  [facility] ([facility])    N/A  Family history is reviewed and has not changed   Pertinent information:

## 2023-11-01 NOTE — PLAN OF CARE
Problem: Adult Behavioral Health Plan of Care  Goal: Patient-Specific Goal (Individualization)  Outcome: Ongoing, Progressing     Problem: Adult Behavioral Health Plan of Care  Goal: Plan of Care Review  Outcome: Ongoing, Progressing  Goal: Patient-Specific Goal (Individualization)  Outcome: Ongoing, Progressing  Goal: Adheres to Safety Considerations for Self and Others  Outcome: Ongoing, Progressing  Goal: Absence of New-Onset Illness or Injury  Outcome: Ongoing, Progressing  Goal: Optimized Coping Skills in Response to Life Stressors  Outcome: Ongoing, Progressing     Problem: Activity and Energy Impairment (Anxiety Signs/Symptoms)  Goal: Optimized Energy Level (Anxiety Signs/Symptoms)  Outcome: Ongoing, Progressing     Problem: Cognitive Impairment (Anxiety Signs/Symptoms)  Goal: Optimized Cognitive Function (Anxiety Signs/Symptoms)  Outcome: Ongoing, Progressing     Problem: Mood Impairment (Anxiety Signs/Symptoms)  Goal: Improved Mood Symptoms (Anxiety Signs/Symptoms)  Outcome: Ongoing, Progressing     Problem: Sleep Impairment (Anxiety Signs/Symptoms)  Goal: Improved Sleep (Anxiety Signs/Symptoms)  Outcome: Ongoing, Progressing     Problem: Social, Occupational or Functional Impairment (Anxiety Signs/Symptoms)  Goal: Enhanced Social, Occupational or Functional Skills (Anxiety Signs/Symptoms)  Outcome: Ongoing, Progressing     Problem: Somatic Disturbance (Anxiety Signs/Symptoms)  Goal: Improved Somatic Symptoms (Anxiety Signs/Symptoms)  Outcome: Ongoing, Progressing     Problem: Behavior Regulation Impairment (Psychotic Signs/Symptoms)  Goal: Improved Behavioral Control (Psychotic Signs/Symptoms)  Outcome: Ongoing, Progressing     Problem: Cognitive Impairment (Psychotic Signs/Symptoms)  Goal: Optimal Cognitive Function (Psychotic Signs/Symptoms)  Outcome: Ongoing, Progressing     Problem: Decreased Participation and Engagement (Psychotic Signs/Symptoms)  Goal: Increased Participation and Engagement  (Psychotic Signs/Symptoms)  Outcome: Ongoing, Progressing     Problem: Mood Impairment (Psychotic Signs/Symptoms)  Goal: Improved Mood Symptoms (Psychotic Signs/Symptoms)  Outcome: Ongoing, Progressing     Problem: Psychomotor Impairment (Psychotic Signs/Symptoms)  Goal: Improved Psychomotor Symptoms (Psychotic Signs/Symptoms)  Outcome: Ongoing, Progressing     Problem: Sensory Perception Impairment (Psychotic Signs/Symptoms)  Goal: Decreased Sensory Symptoms (Psychotic Signs/Symptoms)  Outcome: Ongoing, Progressing     Problem: Sleep Disturbance (Psychotic Signs/Symptoms)  Goal: Improved Sleep (Psychotic Signs/Symptoms)  Outcome: Ongoing, Progressing     Problem: Social, Occupational or Functional Impairment (Psychotic Signs/Symptoms)  Goal: Enhanced Social, Occupational or Functional Skills (Psychotic Signs/Symptoms)  Outcome: Ongoing, Progressing

## 2023-11-01 NOTE — PROGRESS NOTES
"PSYCHIATRY DAILY INPATIENT PROGRESS NOTE  SUBSEQUENT HOSPITAL VISIT    ENCOUNTER DATE: 11/1/2023  SITE: [facility] [place]    DATE OF ADMISSION: 10/31/2023  1:05 PM  LENGTH OF STAY: 1 days      CHIEF COMPLAINT   Shellie Atkinson is a 52 y.o. female, seen during daily [place] [facility] rounds on the inpatient unit.  Shellie Atkinson presented with the chief complaint of  psychosis, "I'm not sure why I'm here."      The patient was seen and examined. The chart was reviewed.     Reviewed notes from Rns, MD, and RD and labs from the last 24 hours.    The patient's case was discussed with the treatment team/care providers today including Rns, CTRS, NP, and SW    Staff reports no behavioral or management issues.     The patient has been compliant with treatment.      Subjective 11/01/2023       Today the patient reports that she is doing ok. She denied all symptoms as documented below.   -she reports that she had not been eating at home secondary to not being able to afford food  -she discussed how an acquaintance is a narcicisstic person who has it out for her because that person is bad; she does believe that this individual hacked her phone.    Paranoia noted- schizophrenia vs delusional disorder (?);       The patient denies any side effects to medications.        Psychiatric ROS (observed, reported, or endorsed/denied):  Depressed mood - denies  Interest/pleasure/anhedonia: denies  Guilt/hopelessness/worthlessness - denies  Changes in Sleep - denies  Changes in Appetite - denies  Changes in Concentration - denies  Changes in Energy - denies  PMA/R- denies  Suicidal- active/passive ideations - denies  Homicidal ideations: active/passive ideations - denies    Hallucinations - denies  Delusions - denies  Disorganized behavior - denies  Disorganized speech - denies  Negative symptoms - denies    Elevated mood - denies  Decreased need for sleep - denies  Grandiosity - denies  Racing thoughts - denies  Impulsivity - " denies  Irritability- denies  Increased energy - denies  Distractibility - denies  Increase in goal-directed activity or PMA- denies    Symptoms of SHARMIN - denies  Symptoms of Panic Disorder- denies  Symptoms of PTSD - denies        Overall progress: Patient is showing mild improvement         Psychotherapy:  Target symptoms: anxiety , psychosis  Why chosen therapy is appropriate versus another modality: relevant to diagnosis, patient responds to this modality, evidence based practice  Outcome monitoring methods: self-report, observation  Therapeutic intervention type: insight oriented psychotherapy, behavior modifying psychotherapy, supportive psychotherapy, interactive psychotherapy  Topics discussed/themes: building skills sets for symptom management, symptom recognition  The patient's response to the intervention is accepting. The patient's progress toward treatment goals is limited.   Duration of intervention: 16 minutes.        Medical ROS  General ROS: negative  Ophthalmic ROS: negative  ENT ROS: negative  Allergy and Immunology ROS: negative  Hematological and Lymphatic ROS: negative  Endocrine ROS: negative  Respiratory ROS: no cough, shortness of breath, or wheezing  Cardiovascular ROS: no chest pain or dyspnea on exertion  Gastrointestinal ROS: no abdominal pain, change in bowel habits, or black or bloody stools  Genito-Urinary ROS: no dysuria, trouble voiding, or hematuria  Musculoskeletal ROS: negative  Neurological ROS: no TIA or stroke symptoms  Dermatological ROS: negative        PAST MEDICAL HISTORY   Past Medical History:   Diagnosis Date    Insomnia            PSYCHOTROPIC MEDICATIONS   Scheduled Meds:  Continuous Infusions:  PRN Meds:.acetaminophen, aluminum-magnesium hydroxide-simethicone, benzonatate, benztropine mesylate, hydrOXYzine pamoate, loperamide, nicotine, OLANZapine **AND** OLANZapine, ondansetron, promethazine        EXAMINATION    VITALS   Vitals:    10/31/23 1300 10/31/23 1918  "11/01/23 0731   BP: (!) 141/72 (!) 179/99 125/72   BP Location: Left arm Left arm Left arm   Patient Position: Sitting Sitting Sitting   Pulse: 71 65 82   Resp: 20 20 18   Temp: 98.3 °F (36.8 °C) 99.4 °F (37.4 °C) 98 °F (36.7 °C)   TempSrc: Oral Oral Oral   SpO2: 98% 100% (!) 94%   Weight: 70.8 kg (156 lb 1.4 oz)     Height: 5' 6" (1.676 m)         Body mass index is 25.19 kg/m².         CONSTITUTIONAL  General Appearance: unremarkable, age appropriate     MUSCULOSKELETAL  Muscle Strength and Tone:no dyskinesia, no dystonia, no tremor, no tic  Abnormal Involuntary Movements: No  Gait and Station: non-ataxic     PSYCHIATRIC   Level of Consciousness: awake and alert   Orientation: person, place, time, and situation  Grooming: Hospital garb and Well groomed  Psychomotor Behavior: normal, cooperative, eye contact normal  Speech: normal tone, normal rate, normal pitch, normal volume, spontaneous  Language: grossly intact, able to name, able to repeat  Mood: anxious  Affect: Anxious, Consistent with mood, Congruent with thought, and Full  Thought Process: linear, illogical at times  Associations: intact   Thought Content: +delusions, denies SI, and denies HI  Perceptions: denies AH and denies  VH  Memory: Able to recall past events, Remote intact, and Recent intact  Attention:Normal and Attends to interview without distraction  Fund of Knowledge: Aware of current events and Vocabulary appropriate   Estimate if Intelligence:  Average based on work/education history, vocabulary and mental status exam  Insight: limited awareness of illness- fair  Judgment: behavior is adequate to circumstances    DIAGNOSTIC TESTING   Laboratory Results  Recent Results (from the past 24 hour(s))   Hemoglobin A1c    Collection Time: 11/01/23  5:43 AM   Result Value Ref Range    Hemoglobin A1C 5.3 4.0 - 5.6 %    Estimated Avg Glucose 105 68 - 131 mg/dL   Lipid Panel    Collection Time: 11/01/23  5:43 AM   Result Value Ref Range    Cholesterol " 231 (H) 120 - 199 mg/dL    Triglycerides 63 30 - 150 mg/dL    HDL 77 (H) 40 - 75 mg/dL    LDL Cholesterol 141.4 63.0 - 159.0 mg/dL    HDL/Cholesterol Ratio 33.3 20.0 - 50.0 %    Total Cholesterol/HDL Ratio 3.0 2.0 - 5.0    Non-HDL Cholesterol 154 mg/dL             MEDICAL DECISION MAKING      ASSESSMENT:   Schizophrenia, acute exacerbation, severe   R/o Delusional Disorder  Unspecified Anxiety Disorder     Psychosocial stressors     Nicotine Dependence         PROBLEM LIST AND MANAGEMENT PLANS     Psychosis: pt counseled  -she is currently refusing medications and denying symptoms  -Zyprexa prn  -psychotherapy provided     Anxiety: pt counseled  -vistaril prn  -uncertain if primary vs secondary to paranoia  -psychotherapy provided     Psychosocial stressors: pt counseled   -Sw consulted to assist with resources     Nicotine Dependence: pt counseled  -started/continue nicotine 14 mg patch dermal q day           Discussed diagnosis, risks and benefits of proposed treatment vs alternative treatments vs no treatment, potential side effects of these treatments and the inherent unpredictability of treatment. The patient expresses understanding of the above and displays the capacity to agree with this treatment given said understanding. Patient also agrees that, currently, the benefits outweigh the risks and would like to pursue/continue treatment at this time.    Any medications being used off-label were discussed with the patient inclusive of the evidence base for the use of the medications and consent was obtained for the off-label use of the medication.       DISCHARGE PLANNING  Expected Disposition Plan: Home or Self Care      NEED FOR CONTINUED HOSPITALIZATION  Psychiatric illness continues to pose a potential threat to life or bodily function, of self or others, thereby requiring the need for continued inpatient psychiatric hospitalization: Yes, due to: significant psychotic thought disorder and gravely disabled, as  evidenced by:  Ongoing concerns with grave disability with patient unable to perform basic feeding, hygiene and dressing activities without significant constant support. and Ongoing concerns with perceptual aberrancy and paranoid persecutory delusions leading to potential harm of self or others.    Protective inpatient pyschiatric hospitalization required while a safe disposition plan is enacted: Yes    Patient stabilized and ready for discharge from inpatient psychiatric unit: No        STAFF:   J Carlos Buckley MD  Psychiatry

## 2023-11-01 NOTE — NURSING
Patient POC reviewed with patient and continued on day of care. Patient compliant with medications and polite to staff. Patient does not believe she needs to be here. Patient denies HI, SI, AH. Patient ate 3 meals during the day. Patient participated in group, rested in her room, and watched TV throughout the day. Patient appears happy and voices optimism about what she plans on doing after being discharged from the facility. Will continue to monitor patient while on unit.

## 2023-11-01 NOTE — SUBJECTIVE & OBJECTIVE
Past Medical History:   Diagnosis Date    Insomnia        Past Surgical History:   Procedure Laterality Date     SECTION         Review of patient's allergies indicates:  No Known Allergies    No current facility-administered medications on file prior to encounter.     Current Outpatient Medications on File Prior to Encounter   Medication Sig    diazePAM (VALIUM) 5 MG tablet Take 1 tablet (5 mg total) by mouth every 12 (twelve) hours. Do not drive while taking this medication for 2 days    meclizine (ANTIVERT) 25 mg tablet Take 1 tablet (25 mg total) by mouth 3 (three) times daily as needed for Dizziness or Nausea.     Family History    None       Tobacco Use    Smoking status: Every Day     Types: Vaping with nicotine    Smokeless tobacco: Not on file   Substance and Sexual Activity    Alcohol use: No    Drug use: Never    Sexual activity: Yes     Partners: Male     Review of Systems   Constitutional:  Negative for fatigue and fever.   HENT:  Negative for congestion, ear pain and sore throat.    Eyes:  Negative for pain and discharge.   Respiratory:  Negative for cough, shortness of breath and wheezing.    Gastrointestinal:  Negative for abdominal pain, constipation, diarrhea, nausea and vomiting.   Endocrine: Negative for cold intolerance and heat intolerance.   Genitourinary:  Negative for difficulty urinating, dysuria and frequency.   Musculoskeletal:  Negative for arthralgias.   Allergic/Immunologic: Negative for environmental allergies.   Neurological:  Negative for dizziness, tremors and seizures.   Psychiatric/Behavioral:  Positive for behavioral problems and sleep disturbance.    All other systems reviewed and are negative.    Objective:     Vital Signs (Most Recent):  Temp: 98 °F (36.7 °C) (23)  Pulse: 82 (23)  Resp: 18 (23)  BP: 125/72 (23)  SpO2: (!) 94 % (23) Vital Signs (24h Range):  Temp:  [98 °F (36.7 °C)-99.4 °F (37.4 °C)] 98 °F (36.7  °C)  Pulse:  [65-82] 82  Resp:  [18-20] 18  SpO2:  [94 %-100 %] 94 %  BP: (125-179)/(72-99) 125/72     Weight: 70.8 kg (156 lb 1.4 oz)  Body mass index is 25.19 kg/m².     Physical Exam  Vitals and nursing note reviewed.   Constitutional:       Appearance: Normal appearance.   HENT:      Head: Normocephalic and atraumatic.      Nose: Nose normal.      Mouth/Throat:      Mouth: Mucous membranes are moist.      Pharynx: Oropharynx is clear.   Eyes:      Extraocular Movements: Extraocular movements intact.      Conjunctiva/sclera: Conjunctivae normal.      Pupils: Pupils are equal, round, and reactive to light.   Cardiovascular:      Rate and Rhythm: Normal rate and regular rhythm.      Pulses: Normal pulses.      Heart sounds: Normal heart sounds.   Pulmonary:      Effort: Pulmonary effort is normal.      Breath sounds: Normal breath sounds.   Abdominal:      General: Abdomen is flat. Bowel sounds are normal.      Palpations: Abdomen is soft.   Musculoskeletal:         General: Normal range of motion.      Cervical back: Normal range of motion and neck supple.   Skin:     General: Skin is warm and dry.      Capillary Refill: Capillary refill takes less than 2 seconds.      Comments: No rashes on limited skin exam.   Neurological:      General: No focal deficit present.      Mental Status: She is alert and oriented to person, place, and time.      Cranial Nerves: No cranial nerve deficit.      Comments: I Olfactory:  Sense of smell intact    II Optic:  Pupils equal round react to light.  Vision intact.    III, IV, VI, Ocular motor, Trochlear, Abducens:  Extraocular movements intact    V Trigeminal:  Facial sensation intact facial sensation intact,, muscles of mastication intact muscles of mastication intact, corneal reflex intact, corneal reflex intact    VII Facial:  Muscles of facial expression intact     VIII Vestibular cochlear: Hearing intact vestibular cochlear: Hearing intact    IX Glossopharyngeal:  Gag reflex  intact.  Tasting intact.     X Vagus:  Gag reflex intact.    XI Spinal Accessory:  Shoulder shrug intact.  Head rotation intact.    XII Hypoglossal:  Tongue movements intact.     Psychiatric:      Comments: Patient appears paranoid              CRANIAL NERVES     CN III, IV, VI   Pupils are equal, round, and reactive to light.       Significant Labs: All pertinent labs within the past 24 hours have been reviewed.    Significant Imaging: I have reviewed all pertinent imaging results/findings within the past 24 hours.

## 2023-11-01 NOTE — HPI
Chief Complaint   Patient presents with    Psychiatric Evaluation       Pt BIB [employer] officers with [facility] for psych eval, Pt has not been taking her medications or eating, not taking care of herself and unwilling to discuss with family      Shellie Atkinson is a 52 y.o. female with a PMHx of paranoia, insomnia, who presents to the ED via police under [facility] for psychiatric evaluation. Patient comes under [facility] file out by her son. History provided by independent historian, patient's son, who reports patient has been stating that her neighbors and store owners have been doing Druze on her and are out to get her. He further endorses she reported they are turning off her lights and wifi. He further endorses she has not been taking her psychiatric medication since she was last discharged for her psychiatric admission a year ago. He notes she has also has not been eating properly. Patient currently denies this, and reports she is concerned because her banking information was stolen. No alleviating or exacerbating factors noted. NKDA.      The history is provided by the patient and a relative. No  was used.

## 2023-11-01 NOTE — NURSING
POC reviewed this shift and is ongoing.  Pt is calm and cooperative on unit and denies SI/HI/AVH.

## 2023-11-02 PROCEDURE — S4991 NICOTINE PATCH NONLEGEND: HCPCS | Performed by: PSYCHIATRY & NEUROLOGY

## 2023-11-02 PROCEDURE — 99232 PR SUBSEQUENT HOSPITAL CARE,LEVL II: ICD-10-PCS | Mod: SA,HB,, | Performed by: PSYCHIATRY & NEUROLOGY

## 2023-11-02 PROCEDURE — 90833 PSYTX W PT W E/M 30 MIN: CPT | Mod: SA,HB,, | Performed by: PSYCHIATRY & NEUROLOGY

## 2023-11-02 PROCEDURE — 99232 SBSQ HOSP IP/OBS MODERATE 35: CPT | Mod: SA,HB,, | Performed by: PSYCHIATRY & NEUROLOGY

## 2023-11-02 PROCEDURE — 25000003 PHARM REV CODE 250: Performed by: PSYCHIATRY & NEUROLOGY

## 2023-11-02 PROCEDURE — 11400000 HC PSYCH PRIVATE ROOM

## 2023-11-02 PROCEDURE — 90833 PR PSYCHOTHERAPY W/PATIENT W/E&M, 30 MIN (ADD ON): ICD-10-PCS | Mod: SA,HB,, | Performed by: PSYCHIATRY & NEUROLOGY

## 2023-11-02 RX ADMIN — NICOTINE 1 PATCH: 14 PATCH, EXTENDED RELEASE TRANSDERMAL at 08:11

## 2023-11-02 NOTE — NURSING
POC reviewed this shift and is ongoing.  Pt calm and cooperative on unit, pt had no medications due this shift.  Pt denies SI/HI/AVH.  Pt denies that she needs to be here and expresses desire for discharge.

## 2023-11-02 NOTE — PLAN OF CARE
Problem: Adult Behavioral Health Plan of Care  Goal: Plan of Care Review  Outcome: Ongoing, Progressing  Goal: Patient-Specific Goal (Individualization)  Outcome: Ongoing, Progressing  Goal: Adheres to Safety Considerations for Self and Others  Outcome: Ongoing, Progressing  Goal: Absence of New-Onset Illness or Injury  Outcome: Ongoing, Progressing  Goal: Optimized Coping Skills in Response to Life Stressors  Outcome: Ongoing, Progressing  Goal: Develops/Participates in Therapeutic [place] to Support Successful Transition  Outcome: Ongoing, Progressing     Problem: Activity and Energy Impairment (Anxiety Signs/Symptoms)  Goal: Optimized Energy Level (Anxiety Signs/Symptoms)  Outcome: Ongoing, Progressing     Problem: Cognitive Impairment (Anxiety Signs/Symptoms)  Goal: Optimized Cognitive Function (Anxiety Signs/Symptoms)  Outcome: Ongoing, Progressing     Problem: Mood Impairment (Anxiety Signs/Symptoms)  Goal: Improved Mood Symptoms (Anxiety Signs/Symptoms)  Outcome: Ongoing, Progressing     Problem: Sleep Impairment (Anxiety Signs/Symptoms)  Goal: Improved Sleep (Anxiety Signs/Symptoms)  Outcome: Ongoing, Progressing     Problem: Social, Occupational or Functional Impairment (Anxiety Signs/Symptoms)  Goal: Enhanced Social, Occupational or Functional Skills (Anxiety Signs/Symptoms)  Outcome: Ongoing, Progressing     Problem: Somatic Disturbance (Anxiety Signs/Symptoms)  Goal: Improved Somatic Symptoms (Anxiety Signs/Symptoms)  Outcome: Ongoing, Progressing     Problem: Behavior Regulation Impairment (Psychotic Signs/Symptoms)  Goal: Improved Behavioral Control (Psychotic Signs/Symptoms)  Outcome: Ongoing, Progressing     Problem: Cognitive Impairment (Psychotic Signs/Symptoms)  Goal: Optimal Cognitive Function (Psychotic Signs/Symptoms)  Outcome: Ongoing, Progressing     Problem: Decreased Participation and Engagement (Psychotic Signs/Symptoms)  Goal: Increased Participation and Engagement (Psychotic  Signs/Symptoms)  Outcome: Ongoing, Progressing     Problem: Mood Impairment (Psychotic Signs/Symptoms)  Goal: Improved Mood Symptoms (Psychotic Signs/Symptoms)  Outcome: Ongoing, Progressing     Problem: Psychomotor Impairment (Psychotic Signs/Symptoms)  Goal: Improved Psychomotor Symptoms (Psychotic Signs/Symptoms)  Outcome: Ongoing, Progressing     Problem: Sensory Perception Impairment (Psychotic Signs/Symptoms)  Goal: Decreased Sensory Symptoms (Psychotic Signs/Symptoms)  Outcome: Ongoing, Progressing     Problem: Sleep Disturbance (Psychotic Signs/Symptoms)  Goal: Improved Sleep (Psychotic Signs/Symptoms)  Outcome: Ongoing, Progressing     Problem: Social, Occupational or Functional Impairment (Psychotic Signs/Symptoms)  Goal: Enhanced Social, Occupational or Functional Skills (Psychotic Signs/Symptoms)  Outcome: Ongoing, Progressing
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
## 2023-11-02 NOTE — PLAN OF CARE
11/02/23 1710   Step 1: Warning Signs   Warning Sign 1 unecessary hospitalization   Warning Sign 2 n/a   Warning Sign 3 n/a   Step 2: Internal coping strategies - Things I can do to take my mind off my problems without contacting another person:   Coping Strategy 1 reading   Coping Strategy 2 writing   Coping Strategy 3 praying   Step 3: People and social settings that provide distraction:   1. Name Kelley Atkinson (mother)   2. Name Kavita Ibanez (friend)   3. Place [employer] (restaurant)   4. Place [employer]    Step 4: People whom I can ask for help:   1. Person Kelley Atkinson (mother)   2. Person Andreia Atkinson (daughter)   3. Person n/a   Step 5: Professionals or agencies I can contact during a crisis:   1. [facility]       Phone [phone]   3. Suicide Prevention Lifeline: 988 Suicide Prevention Lifeline 988   4. Local Emergency Service       911   Step 6: Making the environment safer (plan for lethal means safety)   Safe Environment Plan pray, go to the hospital   Safe Environment Optional: What is most important to me and worth living for? myself, my goals   Safety Plan Tasks   Provided a Hard Copy to the Patient Y   Explained How to Follow the Steps Y   Discussed Facilitators and Barriers Y

## 2023-11-02 NOTE — PLAN OF CARE
POC reviewed this shift and is on going. Patient is withdrawn, calm, cooperative, quiet, and sleeping. Denies Suicidal Ideation, Homicidal Ideation, Auditory Hallucinations, Visual Hallucinations, Tactile Hallucinations, Gustatory Hallucinations, and Delusions. Patient stayed in her room most of the day sleeping. Patient did come out for meals. Patient met with Pina the . Pt continues to be medication compliant and staff will continue to monitor pt closely while on the unit.     Well aware of Meds s/e , benefits of above meds given Well aware of Meds s/e , benefits of above meds given Well aware of Meds s/e , benefits of above meds given
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
## 2023-11-03 PROCEDURE — 99232 SBSQ HOSP IP/OBS MODERATE 35: CPT | Mod: SA,HB,, | Performed by: PSYCHIATRY & NEUROLOGY

## 2023-11-03 PROCEDURE — S4991 NICOTINE PATCH NONLEGEND: HCPCS | Performed by: PSYCHIATRY & NEUROLOGY

## 2023-11-03 PROCEDURE — 99232 PR SUBSEQUENT HOSPITAL CARE,LEVL II: ICD-10-PCS | Mod: SA,HB,, | Performed by: PSYCHIATRY & NEUROLOGY

## 2023-11-03 PROCEDURE — 90833 PR PSYCHOTHERAPY W/PATIENT W/E&M, 30 MIN (ADD ON): ICD-10-PCS | Mod: SA,HB,, | Performed by: PSYCHIATRY & NEUROLOGY

## 2023-11-03 PROCEDURE — 25000003 PHARM REV CODE 250: Performed by: PSYCHIATRY & NEUROLOGY

## 2023-11-03 PROCEDURE — 90833 PSYTX W PT W E/M 30 MIN: CPT | Mod: SA,HB,, | Performed by: PSYCHIATRY & NEUROLOGY

## 2023-11-03 PROCEDURE — 11400000 HC PSYCH PRIVATE ROOM

## 2023-11-03 RX ADMIN — NICOTINE 1 PATCH: 14 PATCH, EXTENDED RELEASE TRANSDERMAL at 08:11
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
## 2023-11-03 NOTE — PSYCH
POC reviewed and is ongoing. Pt denies S/HI and A/VH. Pt is interacting well with peers and staff and sits in dining area watching television. Pt is calm and cooperative. Will continue to monitor pt to ensure pt's health and safety while on the unit.

## 2023-11-04 PROCEDURE — S4991 NICOTINE PATCH NONLEGEND: HCPCS | Performed by: PSYCHIATRY & NEUROLOGY

## 2023-11-04 PROCEDURE — 99232 PR SUBSEQUENT HOSPITAL CARE,LEVL II: ICD-10-PCS | Mod: AF,HB,, | Performed by: PSYCHIATRY & NEUROLOGY

## 2023-11-04 PROCEDURE — 11400000 HC PSYCH PRIVATE ROOM

## 2023-11-04 PROCEDURE — 90833 PR PSYCHOTHERAPY W/PATIENT W/E&M, 30 MIN (ADD ON): ICD-10-PCS | Mod: AF,HB,, | Performed by: PSYCHIATRY & NEUROLOGY

## 2023-11-04 PROCEDURE — 99232 SBSQ HOSP IP/OBS MODERATE 35: CPT | Mod: AF,HB,, | Performed by: PSYCHIATRY & NEUROLOGY

## 2023-11-04 PROCEDURE — 25000003 PHARM REV CODE 250: Performed by: PSYCHIATRY & NEUROLOGY

## 2023-11-04 PROCEDURE — 90833 PSYTX W PT W E/M 30 MIN: CPT | Mod: AF,HB,, | Performed by: PSYCHIATRY & NEUROLOGY

## 2023-11-04 RX ORDER — ARIPIPRAZOLE 5 MG/1
5 TABLET ORAL DAILY
Status: DISCONTINUED | OUTPATIENT
Start: 2023-11-04 | End: 2023-11-11 | Stop reason: HOSPADM

## 2023-11-04 RX ADMIN — NICOTINE 1 PATCH: 14 PATCH, EXTENDED RELEASE TRANSDERMAL at 08:11

## 2023-11-04 NOTE — PLAN OF CARE
"POC reviewed and is ongoing. Pt is calm, cooperative and quiet. Pt self isolating to room today, laid in bed all day and ambulated to unit as needed for meals. Pt refused medication, stated "I don't need that and I am not taking it." Pt denies H/SI and A/VH. Will continue to monitor pt to ensure health and safety.   "

## 2023-11-04 NOTE — PROGRESS NOTES
"PSYCHIATRY DAILY INPATIENT PROGRESS NOTE  SUBSEQUENT HOSPITAL VISIT    ENCOUNTER DATE: 11/4/2023  SITE: [facility]    DATE OF ADMISSION: 10/31/2023  1:05 PM  LENGTH OF STAY: 4 days    CHIEF COMPLAINT   Shellie Atkinson is a 52 y.o. female, seen during daily de la rosa rounds on the inpatient unit.  Shellie Atkinson presented with the chief complaint of psychosis, "I'm not sure why I'm here."    The patient was seen and examined. The chart was reviewed.     Reviewed notes from Rns, NP, SW, and LPN and labs from the last 24 hours.    The patient's case was discussed with the treatment team/care providers today including Rns    Staff reports no behavioral or management issues.     The patient has been compliant with treatment.    Subjective 11/04/2023    Per yesterday's notes  Patient continues to verbalize paranoid ideations regarding her cellphone and various Internet accounts.  Rate of speech has increased compared to yesterday, patient requires frequent redirection during assessment.  She continues to deny auditory visual hallucinations, denies suicidal or homicidal ideations.  Once more, patient states that she does not have any specific suspicion as to who is hacking her accounts, and thus states that she has no intention to harm anyone.  Reports sleep last night was perfect.  Patient once again denies need for any psychotropic medications.   Note: Following assessment, NP was directed to SW collateral note with patient's daughter containing concerning information including patient refusing to eat dt fear of being poisoned, pt kicking in a neighbors door because they were "practicing Christian" and other erratic behaviors. Pt daughter reportedly requesting forced medication.    Today, the patient continues to reports that she feels well. She continues to deny all psychiatric symptoms as documented below.    She denied the above accusations made in the collateral note. She is very defensive when asked " about the symptoms reported in the collateral note  -will offer trial of abilify        Psychiatric ROS (observed, reported, or endorsed/denied):  Depressed mood - No  Interest/pleasure/anhedonia: No  Guilt/hopelessness/worthlessness - No  Changes in Sleep - No  Changes in Appetite - No  Changes in Concentration - Yes  Changes in Energy - No  PMA/R- No  Suicidal- active/passive ideations - No  Homicidal ideations: active/passive ideations - No    Hallucinations - No  Delusions - Yes  Disorganized behavior - No  Disorganized speech - fluctuating  Negative symptoms - No    Elevated mood - variable  Decreased need for sleep - No  Grandiosity - No  Racing thoughts - Yes  Impulsivity - fluctuating  Irritability- No  Increased energy - No  Distractibility - Yes  Increase in goal-directed activity or PMA- No    Symptoms of SHARMIN - fluctuating  Symptoms of Panic Disorder- No  Symptoms of PTSD - No    Overall progress: Patient is showing mild improvement     Psychotherapy:  Target symptoms: distractability, psychosis  Why chosen therapy is appropriate versus another modality: relevant to diagnosis, evidence based practice  Outcome monitoring methods: self-report, observation  Therapeutic intervention type: insight oriented psychotherapy, supportive psychotherapy  Topics discussed/themes: building skills sets for symptom management, symptom recognition, financial stressors  The patient's response to the intervention is guarded. The patient's progress toward treatment goals is fair.   Duration of intervention: 16 minutes.    Medical ROS  Review of Systems   Constitutional: Negative.    HENT: Negative.     Eyes: Negative.    Respiratory: Negative.     Cardiovascular: Negative.    Gastrointestinal: Negative.    Genitourinary: Negative.    Musculoskeletal: Negative.    Skin: Negative.    Neurological: Negative.    Endo/Heme/Allergies: Negative.    Psychiatric/Behavioral:          As noted       PAST MEDICAL HISTORY   Past Medical  History:   Diagnosis Date    Insomnia        PSYCHOTROPIC MEDICATIONS   Scheduled Meds:   nicotine  1 patch Transdermal Daily     Continuous Infusions:  PRN Meds:.acetaminophen, aluminum-magnesium hydroxide-simethicone, benzonatate, benztropine mesylate, hydrOXYzine pamoate, loperamide, OLANZapine **AND** OLANZapine, ondansetron, promethazine    EXAMINATION    VITALS   Vitals:    11/02/23 1915 11/03/23 0757 11/03/23 1924 11/04/23 0715   BP: (!) 170/78 (!) 121/59 (!) 143/83 (!) 116/57   BP Location: Left arm Left arm Left arm Left arm   Patient Position: Sitting Sitting Sitting Sitting   Pulse: 71 66 69 68   Resp: 20 18 18 18   Temp: 98.9 °F (37.2 °C) 98.5 °F (36.9 °C) 98.4 °F (36.9 °C) 98.5 °F (36.9 °C)   TempSrc: Oral Oral Oral Oral   SpO2: 100% 98% 100% 100%   Weight:       Height:           Body mass index is 25.19 kg/m².    CONSTITUTIONAL  General Appearance: unremarkable, age appropriate    MUSCULOSKELETAL  Muscle Strength and Tone:no tremor, no tic  Abnormal Involuntary Movements: No  Gait and Station: non-ataxic    PSYCHIATRIC   Level of Consciousness: awake, alert , and oriented  Orientation: person, place, time, and situation  Grooming: Casually dressed and Well groomed  Psychomotor Behavior: normal, cooperative, friendly and cooperative  Speech: normal tone, normal pitch, normal volume, rate of speech possibly increased though this may be baseline  Language: grossly intact  Mood: Great  Affect: Superficial  Thought Process: circumstantial  Associations: loose   Thought Content: DENIES suicidal ideation, DENIES homicidal ideation, and paranoia  Perceptions: denies hallucinations  Memory: Able to recall past events, Remote intact, and Recent intact  Attention:Impaired to some degree  Fund of Knowledge: Aware of current events and Vocabulary appropriate   Estimate if Intelligence:  Average based on work/education history, vocabulary and mental status exam  Insight: limited awareness of illness  Judgment:  behavior is adequate to circumstances    DIAGNOSTIC TESTING   Laboratory Results  No results found for this or any previous visit (from the past 24 hour(s)).    MEDICAL DECISION MAKING   ASSESSMENT:   Schizophrenia, acute exacerbation, severe              R/o Delusional Disorder  Unspecified Anxiety Disorder     Psychosocial stressors     Nicotine Dependence         PROBLEM LIST AND MANAGEMENT PLANS     Psychosis: pt counseled  -she is currently refusing medications and denying symptoms  -Zyprexa prn  -psychotherapy provided  Offering abilify 2 mg/day- increase to 5 mg po q day     Anxiety: pt counseled  -vistaril prn  -uncertain if primary vs secondary to paranoia  -psychotherapy provided     Psychosocial stressors: pt counseled   -Sw consulted to assist with resources     Nicotine Dependence: pt counseled  -started/continue nicotine 14 mg patch dermal q day     Discussed diagnosis, risks and benefits of proposed treatment vs alternative treatments vs no treatment, potential side effects of these treatments and the inherent unpredictability of treatment. The patient expresses understanding of the above and displays the capacity to agree with this treatment given said understanding. Patient also agrees that, currently, the benefits outweigh the risks and would like to pursue/continue treatment at this time.    DISCHARGE PLANNING  Expected Disposition Plan: Home or Self Care    NEED FOR CONTINUED HOSPITALIZATION  Psychiatric illness continues to pose a potential threat to life or bodily function, of self or others, thereby requiring the need for continued inpatient psychiatric hospitalization: Yes, due to: significant psychotic thought disorder, as evidenced by:  Ongoing concerns with grave disability with patient unable to perform basic feeding, hygiene and dressing activities without significant constant support.    Protective inpatient pyschiatric hospitalization required while a safe disposition plan is enacted:  Yes    Patient stabilized and ready for discharge from inpatient psychiatric unit: No      STAFF:   J Carlos Buckley MD  Psychiatry

## 2023-11-04 NOTE — PLAN OF CARE
Patient denies all ideations.  Calm and cooperative on shift.  No concerns with medication.  Continues to refuse PRNS.  Slept through the night

## 2023-11-05 PROCEDURE — 90833 PSYTX W PT W E/M 30 MIN: CPT | Mod: AF,HB,, | Performed by: PSYCHIATRY & NEUROLOGY

## 2023-11-05 PROCEDURE — 99232 PR SUBSEQUENT HOSPITAL CARE,LEVL II: ICD-10-PCS | Mod: AF,HB,, | Performed by: PSYCHIATRY & NEUROLOGY

## 2023-11-05 PROCEDURE — 99232 SBSQ HOSP IP/OBS MODERATE 35: CPT | Mod: AF,HB,, | Performed by: PSYCHIATRY & NEUROLOGY

## 2023-11-05 PROCEDURE — 90833 PR PSYCHOTHERAPY W/PATIENT W/E&M, 30 MIN (ADD ON): ICD-10-PCS | Mod: AF,HB,, | Performed by: PSYCHIATRY & NEUROLOGY

## 2023-11-05 PROCEDURE — 11400000 HC PSYCH PRIVATE ROOM

## 2023-11-05 PROCEDURE — 25000003 PHARM REV CODE 250: Performed by: PSYCHIATRY & NEUROLOGY

## 2023-11-05 PROCEDURE — S4991 NICOTINE PATCH NONLEGEND: HCPCS | Performed by: PSYCHIATRY & NEUROLOGY

## 2023-11-05 RX ADMIN — NICOTINE 1 PATCH: 14 PATCH, EXTENDED RELEASE TRANSDERMAL at 08:11

## 2023-11-05 NOTE — PLAN OF CARE
Problem: Decreased Participation and Engagement (Psychotic Signs/Symptoms)  Goal: Increased Participation and Engagement (Psychotic Signs/Symptoms)  Outcome: Ongoing, Progressing     Problem: Mood Impairment (Psychotic Signs/Symptoms)  Goal: Improved Mood Symptoms (Psychotic Signs/Symptoms)  Outcome: Ongoing, Progressing     Problem: Psychomotor Impairment (Psychotic Signs/Symptoms)  Goal: Improved Psychomotor Symptoms (Psychotic Signs/Symptoms)  Outcome: Ongoing, Progressing     Problem: Sensory Perception Impairment (Psychotic Signs/Symptoms)  Goal: Decreased Sensory Symptoms (Psychotic Signs/Symptoms)  Outcome: Ongoing, Progressing     Problem: Sleep Disturbance (Psychotic Signs/Symptoms)  Goal: Improved Sleep (Psychotic Signs/Symptoms)  Outcome: Ongoing, Progressing     Problem: Social, Occupational or Functional Impairment (Psychotic Signs/Symptoms)  Goal: Enhanced Social, Occupational or Functional Skills (Psychotic Signs/Symptoms)  Outcome: Ongoing, Progressing

## 2023-11-05 NOTE — PLAN OF CARE
Shellie remains calm.  She refuses her medication, Abilify. She 's out of room, watching TV, and communicating with peers when they speak to her.  No issues with sleeping.

## 2023-11-05 NOTE — NURSING
POC reviewed this shift and is ongoing.  Pt calm and cooperative on unit, refused medications.  Pt remains isolated to self and room.  Pt interacts with staff as necessary.  No peer interaction noted this shift.

## 2023-11-05 NOTE — PROGRESS NOTES
"PSYCHIATRY DAILY INPATIENT PROGRESS NOTE  SUBSEQUENT HOSPITAL VISIT    ENCOUNTER DATE: 11/5/2023  SITE: [facility]    DATE OF ADMISSION: 10/31/2023  1:05 PM  LENGTH OF STAY: 5 days    CHIEF COMPLAINT   Shellie Atkinson is a 52 y.o. female, seen during daily de [facility] ro[facility] rounds on the inpatient unit.  Shellie Atkinson presented with the chief complaint of psychosis, "I'm not sure why I'm here."    The patient was seen and examined. The chart was reviewed.     Reviewed notes from Rns and LPN and labs from the last 24 hours.    The patient's case was discussed with the treatment team/care providers today including Rns    Staff reports no behavioral or management issues.     The patient has been non compliant with treatment.    Subjective 11/05/2023      Today, the patient continues to report that she feels well. She continues to deny all psychiatric symptoms as documented below. She refused abilify   She denied  accusations made in the collateral note/admission notes. She is very defensive when asked about the symptoms reported in the collateral note. She does believe in witchcraft and that someone has il intent towards her.    She denied being gravely disabled; she is eating well her. Irritability only occurs when discussing her delusions. Impairment appears to be limited to this issue.   Delusional disorder appears to be the likely diagnosis. She is refusing medications, but she is open to psychotherapy.  -offered trial of abilify, but patient refused.    Although symptoms persist, she does appear to be more stable than what was initially reported (possible improvement). She will likely be stable for discharge and transition to outpatient care in the next 1-2 days.        Psychiatric ROS (observed, reported, or endorsed/denied):  Depressed mood - No  Interest/pleasure/anhedonia: No  Guilt/hopelessness/worthlessness - No  Changes in Sleep - No  Changes in Appetite - No  Changes in Concentration - " Yes  Changes in Energy - No  PMA/R- No  Suicidal- active/passive ideations - No  Homicidal ideations: active/passive ideations - No    Hallucinations - No  Delusions - Yes  Disorganized behavior - No  Disorganized speech - denies  Negative symptoms - No    Elevated mood - denies  Decreased need for sleep - No  Grandiosity - No  Racing thoughts - Yes  Impulsivity - denies  Irritability- No  Increased energy - No  Distractibility - denies  Increase in goal-directed activity or PMA- No    Symptoms of SHARMIN - denies  Symptoms of Panic Disorder- No  Symptoms of PTSD - No    Overall progress: Patient is showing moderate improvement    Psychotherapy:  Target symptoms: distractability, psychosis  Why chosen therapy is appropriate versus another modality: relevant to diagnosis, evidence based practice  Outcome monitoring methods: self-report, observation  Therapeutic intervention type: insight oriented psychotherapy, supportive psychotherapy  Topics discussed/themes: building skills sets for symptom management, symptom recognition, financial stressors  The patient's response to the intervention is guarded. The patient's progress toward treatment goals is fair.   Duration of intervention: 16 minutes.    Medical ROS  Review of Systems   Constitutional: Negative.    HENT: Negative.     Eyes: Negative.    Respiratory: Negative.     Cardiovascular: Negative.    Gastrointestinal: Negative.    Genitourinary: Negative.    Musculoskeletal: Negative.    Skin: Negative.    Neurological: Negative.    Endo/Heme/Allergies: Negative.    Psychiatric/Behavioral:          As noted       PAST MEDICAL HISTORY   Past Medical History:   Diagnosis Date    Insomnia        PSYCHOTROPIC MEDICATIONS   Scheduled Meds:   ARIPiprazole  5 mg Oral Daily    nicotine  1 patch Transdermal Daily     Continuous Infusions:  PRN Meds:.acetaminophen, aluminum-magnesium hydroxide-simethicone, benzonatate, benztropine mesylate, hydrOXYzine pamoate, loperamide,  "OLANZapine **AND** OLANZapine, ondansetron, promethazine    EXAMINATION    VITALS   Vitals:    11/03/23 1924 11/04/23 0715 11/04/23 1911 11/05/23 0734   BP: (!) 143/83 (!) 116/57 (!) 126/58 136/74   BP Location: Left arm Left arm Left arm Left arm   Patient Position: Sitting Sitting Sitting Sitting   Pulse: 69 68 78 63   Resp: 18 18 16 18   Temp: 98.4 °F (36.9 °C) 98.5 °F (36.9 °C) 98.6 °F (37 °C) 98.8 °F (37.1 °C)   TempSrc: Oral Oral Oral Oral   SpO2: 100% 100% 97% 100%   Weight:       Height:           Body mass index is 25.19 kg/m².    CONSTITUTIONAL  General Appearance: unremarkable, age appropriate    MUSCULOSKELETAL  Muscle Strength and Tone:no tremor, no tic  Abnormal Involuntary Movements: No  Gait and Station: non-ataxic    PSYCHIATRIC   Level of Consciousness: awake, alert , and oriented  Orientation: person, place, time, and situation  Grooming: Casually dressed and Well groomed  Psychomotor Behavior: normal, cooperative, friendly and cooperative  Speech: normal tone, normal rate, normal pitch, normal volume  Language: grossly intact  Mood: "ok"  Affect: mood congruent, less irritable  Thought Process:  more linear and organized  Associations: intact   Thought Content: DENIES suicidal ideation, DENIES homicidal ideation, and +delusions  Perceptions: denies hallucinations  Memory: Able to recall past events, Remote intact, and Recent intact  Attention:Intact to conversation  Fund of Knowledge: Aware of current events and Vocabulary appropriate   Estimate if Intelligence:  Average based on work/education history, vocabulary and mental status exam  Insight: limited awareness of illness  Judgment: behavior is adequate to circumstances- fair/improving    DIAGNOSTIC TESTING   Laboratory Results  No results found for this or any previous visit (from the past 24 hour(s)).    MEDICAL DECISION MAKING   ASSESSMENT:     Delusional Disorder  Unspecified Anxiety Disorder     Psychosocial stressors     Nicotine " Dependence         PROBLEM LIST AND MANAGEMENT PLANS     Psychosis: pt counseled  -she is currently refusing medications and denying symptoms  -Zyprexa prn  -psychotherapy provided  Offering abilify 2 mg/day- increase to 5 mg po q day- pt refusing   -psychotherapy provided    Anxiety: pt counseled  -vistaril prn  -uncertain if primary vs secondary to paranoia  -psychotherapy provided     Psychosocial stressors: pt counseled   -Sw consulted to assist with resources     Nicotine Dependence: pt counseled  -started/continue nicotine 14 mg patch dermal q day     Discussed diagnosis, risks and benefits of proposed treatment vs alternative treatments vs no treatment, potential side effects of these treatments and the inherent unpredictability of treatment. The patient expresses understanding of the above and displays the capacity to agree with this treatment given said understanding. Patient also agrees that, currently, the benefits outweigh the risks and would like to pursue/continue treatment at this time.    DISCHARGE PLANNING  Expected Disposition Plan: Home or Self Care- pt will be stable for discharge home in 1-2 days    NEED FOR CONTINUED HOSPITALIZATION  Psychiatric illness continues to pose a potential threat to life or bodily function, of self or others, thereby requiring the need for continued inpatient psychiatric hospitalization: Yes, due to: significant psychotic thought disorder, as evidenced by:  Ongoing concerns with grave disability with patient unable to perform basic feeding, hygiene and dressing activities without significant constant support.    Protective inpatient pyschiatric hospitalization required while a safe disposition plan is enacted: Yes    Patient stabilized and ready for discharge from inpatient psychiatric unit: No      STAFF:   J Carlos Buckley MD  Psychiatry

## 2023-11-06 PROCEDURE — 11400000 HC PSYCH PRIVATE ROOM

## 2023-11-06 PROCEDURE — 99232 SBSQ HOSP IP/OBS MODERATE 35: CPT | Mod: AF,HB,, | Performed by: STUDENT IN AN ORGANIZED HEALTH CARE EDUCATION/TRAINING PROGRAM

## 2023-11-06 PROCEDURE — 90833 PR PSYCHOTHERAPY W/PATIENT W/E&M, 30 MIN (ADD ON): ICD-10-PCS | Mod: AF,HB,, | Performed by: STUDENT IN AN ORGANIZED HEALTH CARE EDUCATION/TRAINING PROGRAM

## 2023-11-06 PROCEDURE — 99232 PR SUBSEQUENT HOSPITAL CARE,LEVL II: ICD-10-PCS | Mod: AF,HB,, | Performed by: STUDENT IN AN ORGANIZED HEALTH CARE EDUCATION/TRAINING PROGRAM

## 2023-11-06 PROCEDURE — 90833 PSYTX W PT W E/M 30 MIN: CPT | Mod: AF,HB,, | Performed by: STUDENT IN AN ORGANIZED HEALTH CARE EDUCATION/TRAINING PROGRAM

## 2023-11-06 NOTE — NURSING
POC reviewed this shift and is ongoing.  Pt calm and cooperative on unit, refuses medications.  Pt remains isolated to self and room.  Minimal staff interactions, no peers interactions this shift.  Pt denies SI/HI/AVH.

## 2023-11-06 NOTE — PLAN OF CARE
Problem: Adult Behavioral Health Plan of Care  Goal: Plan of Care Review  Outcome: Ongoing, Progressing  Goal: Patient-Specific Goal (Individualization)  Outcome: Ongoing, Progressing  Goal: Adheres to Safety Considerations for Self and Others  Outcome: Ongoing, Progressing  Goal: Absence of New-Onset Illness or Injury  Outcome: Ongoing, Progressing  Goal: Optimized Coping Skills in Response to Life Stressors  Outcome: Ongoing, Progressing  Goal: Develops/Participates in Therapeutic [place] to Support Successful Transition  Outcome: Ongoing, Progressing  Goal: Rounds/Family Conference  Outcome: Ongoing, Progressing     Problem: Activity and Energy Impairment (Anxiety Signs/Symptoms)  Goal: Optimized Energy Level (Anxiety Signs/Symptoms)  Outcome: Ongoing, Progressing     Problem: Cognitive Impairment (Anxiety Signs/Symptoms)  Goal: Optimized Cognitive Function (Anxiety Signs/Symptoms)  Outcome: Ongoing, Progressing     Problem: Mood Impairment (Anxiety Signs/Symptoms)  Goal: Improved Mood Symptoms (Anxiety Signs/Symptoms)  Outcome: Ongoing, Progressing     Problem: Sleep Impairment (Anxiety Signs/Symptoms)  Goal: Improved Sleep (Anxiety Signs/Symptoms)  Outcome: Ongoing, Progressing     Problem: Social, Occupational or Functional Impairment (Anxiety Signs/Symptoms)  Goal: Enhanced Social, Occupational or Functional Skills (Anxiety Signs/Symptoms)  Outcome: Ongoing, Progressing     Problem: Somatic Disturbance (Anxiety Signs/Symptoms)  Goal: Improved Somatic Symptoms (Anxiety Signs/Symptoms)  Outcome: Ongoing, Progressing     Problem: Behavior Regulation Impairment (Psychotic Signs/Symptoms)  Goal: Improved Behavioral Control (Psychotic Signs/Symptoms)  Outcome: Ongoing, Progressing     Problem: Cognitive Impairment (Psychotic Signs/Symptoms)  Goal: Optimal Cognitive Function (Psychotic Signs/Symptoms)  Outcome: Ongoing, Progressing     Problem: Decreased Participation and Engagement (Psychotic  Signs/Symptoms)  Goal: Increased Participation and Engagement (Psychotic Signs/Symptoms)  Outcome: Ongoing, Progressing     Problem: Mood Impairment (Psychotic Signs/Symptoms)  Goal: Improved Mood Symptoms (Psychotic Signs/Symptoms)  Outcome: Ongoing, Progressing     Problem: Psychomotor Impairment (Psychotic Signs/Symptoms)  Goal: Improved Psychomotor Symptoms (Psychotic Signs/Symptoms)  Outcome: Ongoing, Progressing     Problem: Sensory Perception Impairment (Psychotic Signs/Symptoms)  Goal: Decreased Sensory Symptoms (Psychotic Signs/Symptoms)  Outcome: Ongoing, Progressing     Problem: Sleep Disturbance (Psychotic Signs/Symptoms)  Goal: Improved Sleep (Psychotic Signs/Symptoms)  Outcome: Ongoing, Progressing     Problem: Social, Occupational or Functional Impairment (Psychotic Signs/Symptoms)  Goal: Enhanced Social, Occupational or Functional Skills (Psychotic Signs/Symptoms)  Outcome: Ongoing, Progressing

## 2023-11-06 NOTE — PLAN OF CARE
POC reviewed this shift and is ongoing.  Pt cooperates with staff and is withdrawn. No meds scheduled this pm. Pt continues to believe that she is being voo dooed . Denies SI, HI, A/V hallucinations at this time. No ss of distress. Will continue to monitor for changes and safety.

## 2023-11-06 NOTE — PROGRESS NOTES
"PSYCHIATRY DAILY INPATIENT PROGRESS NOTE  SUBSEQUENT HOSPITAL VISIT    ENCOUNTER DATE: 11/6/2023  SITE: [facility]    DATE OF ADMISSION: 10/31/2023  1:05 PM  LENGTH OF STAY: 6 days    CHIEF COMPLAINT   Shellie Atkinson is a 52 y.o. female, seen during daily de [facility] ro[facility] rounds on the inpatient unit.  Shellie Atkinson presented with the chief complaint of psychosis, "I'm not sure why I'm here."    The patient was seen and examined. The chart was reviewed.     Reviewed notes from Rns and LPN and labs from the last 24 hours.    The patient's case was discussed with the treatment team/care providers today including Rns    Staff reports no behavioral or management issues.     The patient has been non compliant with treatment.    Subjective 11/06/2023      Today, the patient continues to report that she feels well.     Pt rambling about various paranoid delusions, "There were signs my phone were tampered with," regarding technology and witchcraft, "I have proof, through my accounts, my jobs, that something was being done to my personal information... I'm biblical and you are medical, it's good and evil."    She continues to deny all psychiatric symptoms as documented below.    She refused abilify.    Delusions remain severe and not improving.     Delusional disorder appears to be the likely diagnosis. She is refusing medications, but she is open to psychotherapy.    -offered trial of abilify, but patient refused.          Psychiatric ROS (observed, reported, or endorsed/denied):  Depressed mood - No  Interest/pleasure/anhedonia: No  Guilt/hopelessness/worthlessness - No  Changes in Sleep - No  Changes in Appetite - No  Changes in Concentration - No  Changes in Energy - No  PMA/R- No  Suicidal- active/passive ideations - No  Homicidal ideations: active/passive ideations - No    Hallucinations - No  Delusions - Yes  Disorganized behavior - No  Disorganized speech - denies  Negative symptoms - No    Elevated mood - " denies  Decreased need for sleep - No  Grandiosity - No  Racing thoughts - Yes  Impulsivity - denies  Irritability- No  Increased energy - No  Distractibility - denies  Increase in goal-directed activity or PMA- No    Symptoms of SHARMIN - denies  Symptoms of Panic Disorder- No  Symptoms of PTSD - No    Overall progress: Patient is showing no improvement on the Unit to date        Psychotherapy:  Target symptoms: distractability, psychosis  Why chosen therapy is appropriate versus another modality: relevant to diagnosis, evidence based practice  Outcome monitoring methods: self-report, observation  Therapeutic intervention type: insight oriented psychotherapy, supportive psychotherapy  Topics discussed/themes: building skills sets for symptom management, symptom recognition, financial stressors  The patient's response to the intervention is guarded. The patient's progress toward treatment goals is not progressing.   Duration of intervention: 16 minutes.          Medical ROS  Review of Systems   Constitutional: Negative.    HENT: Negative.     Eyes: Negative.    Respiratory: Negative.     Cardiovascular: Negative.    Gastrointestinal: Negative.    Genitourinary: Negative.    Musculoskeletal: Negative.    Skin: Negative.    Neurological: Negative.    Endo/Heme/Allergies: Negative.    Psychiatric/Behavioral:          As noted       PAST MEDICAL HISTORY   Past Medical History:   Diagnosis Date    Insomnia        PSYCHOTROPIC MEDICATIONS   Scheduled Meds:   ARIPiprazole  5 mg Oral Daily    nicotine  1 patch Transdermal Daily     Continuous Infusions:  PRN Meds:.acetaminophen, aluminum-magnesium hydroxide-simethicone, benzonatate, benztropine mesylate, hydrOXYzine pamoate, loperamide, OLANZapine **AND** OLANZapine, ondansetron, promethazine    EXAMINATION    VITALS   Vitals:    11/04/23 1911 11/05/23 0734 11/05/23 1923 11/06/23 0731   BP: (!) 126/58 136/74 130/70 128/61   BP Location: Left arm Left arm Left arm Left arm  "  Patient Position: Sitting Sitting Sitting Sitting   Pulse: 78 63 73 63   Resp: 16 18 18 18   Temp: 98.6 °F (37 °C) 98.8 °F (37.1 °C) 98.5 °F (36.9 °C) 98.9 °F (37.2 °C)   TempSrc: Oral Oral Oral Oral   SpO2: 97% 100% 97% (!) 94%   Weight:       Height:           Body mass index is 25.19 kg/m².          CONSTITUTIONAL  General Appearance: unremarkable, age appropriate    MUSCULOSKELETAL  Muscle Strength and Tone:no tremor, no tic  Abnormal Involuntary Movements: No  Gait and Station: non-ataxic    PSYCHIATRIC   Level of Consciousness: awake, alert , and oriented  Orientation: person, place, time, and situation  Grooming: Casually dressed and Well groomed  Psychomotor Behavior: normal, cooperative, friendly and cooperative  Speech: normal tone, normal rate, normal pitch, normal volume  Language: grossly intact  Mood: "fine"  Affect: mood congruent, less irritable  Thought Process:  more linear and organized  Associations: intact   Thought Content: DENIES suicidal ideation, DENIES homicidal ideation, and +delusions  Perceptions: denies hallucinations  Memory: Able to recall past events, Remote intact, and Recent intact  Attention:Intact to conversation  Fund of Knowledge: Aware of current events and Vocabulary appropriate   Estimate if Intelligence:  Average based on work/education history, vocabulary and mental status exam  Insight: limited awareness of illness  Judgment: behavior is adequate to circumstances- fair/improving    DIAGNOSTIC TESTING   Laboratory Results  No results found for this or any previous visit (from the past 24 hour(s)).    MEDICAL DECISION MAKING   ASSESSMENT:     Delusional Disorder  Unspecified Anxiety Disorder     Psychosocial stressors     Nicotine Dependence         PROBLEM LIST AND MANAGEMENT PLANS     Psychosis: pt counseled  -she is currently refusing medications and denying symptoms  -Zyprexa prn  -psychotherapy provided  Offering abilify 2 mg/day- increase to 5 mg po q day- pt " refusing   -psychotherapy provided    Anxiety: pt counseled  -vistaril prn  -uncertain if primary vs secondary to paranoia  -psychotherapy provided     Psychosocial stressors: pt counseled   -Sw consulted to assist with resources     Nicotine Dependence: pt counseled  -started/continue nicotine 14 mg patch dermal q day     Discussed diagnosis, risks and benefits of proposed treatment vs alternative treatments vs no treatment, potential side effects of these treatments and the inherent unpredictability of treatment. The patient expresses understanding of the above and displays the capacity to agree with this treatment given said understanding. Patient also agrees that, currently, the benefits outweigh the risks and would like to pursue/continue treatment at this time.    DISCHARGE PLANNING  Expected Disposition Plan: Home or Self Care- pt will be stable for discharge home in 1-2 days    NEED FOR CONTINUED HOSPITALIZATION  Psychiatric illness continues to pose a potential threat to life or bodily function, of self or others, thereby requiring the need for continued inpatient psychiatric hospitalization: Yes, due to: significant psychotic thought disorder, as evidenced by:  Ongoing concerns with grave disability with patient unable to perform basic feeding, hygiene and dressing activities without significant constant support.    Protective inpatient pyschiatric hospitalization required while a safe disposition plan is enacted: Yes    Patient stabilized and ready for discharge from inpatient psychiatric unit: No      STAFF:   Shaun Rinaldi III, MD  Psychiatry

## 2023-11-07 PROCEDURE — 90833 PR PSYCHOTHERAPY W/PATIENT W/E&M, 30 MIN (ADD ON): ICD-10-PCS | Mod: AF,HB,, | Performed by: STUDENT IN AN ORGANIZED HEALTH CARE EDUCATION/TRAINING PROGRAM

## 2023-11-07 PROCEDURE — 99232 PR SUBSEQUENT HOSPITAL CARE,LEVL II: ICD-10-PCS | Mod: AF,HB,, | Performed by: STUDENT IN AN ORGANIZED HEALTH CARE EDUCATION/TRAINING PROGRAM

## 2023-11-07 PROCEDURE — 11400000 HC PSYCH PRIVATE ROOM

## 2023-11-07 PROCEDURE — 90833 PSYTX W PT W E/M 30 MIN: CPT | Mod: AF,HB,, | Performed by: STUDENT IN AN ORGANIZED HEALTH CARE EDUCATION/TRAINING PROGRAM

## 2023-11-07 PROCEDURE — 99232 SBSQ HOSP IP/OBS MODERATE 35: CPT | Mod: AF,HB,, | Performed by: STUDENT IN AN ORGANIZED HEALTH CARE EDUCATION/TRAINING PROGRAM

## 2023-11-07 NOTE — PLAN OF CARE
POC reviewed this shift and is on going. Patient is withdrawn, depressed, calm, quiet, and sleeping. Denies Suicidal Ideation, Homicidal Ideation, Auditory Hallucinations, Visual Hallucinations, Tactile Hallucinations, Gustatory Hallucinations, and Delusions. Patient has been isolating in her room all day sleeping. Patient out for meals only. Patient requested boost or ensure with her meals. Pt continues to be medication compliant and staff will continue to monitor pt closely while on the unit.

## 2023-11-07 NOTE — PLAN OF CARE
POC reviewed this shift and is ongoing.  Pt continue to refuse meds. No neg behavior this shift. Spends most of the shift in her room. No ss of distress. Denies SI, A/V hallucinations. Will continue to monitor for changes and safety.

## 2023-11-07 NOTE — PLAN OF CARE
CSW spoke with patient's daughter Andreia Atkinson regarding discharge plan. Andreia is ok with her mother being discharged to her home but will not be able to provide transportation until the weekend due to work and living in [place]. Andreia is requesting that patient be discharged this weekend

## 2023-11-07 NOTE — PROGRESS NOTES
"PSYCHIATRY DAILY INPATIENT PROGRESS NOTE  SUBSEQUENT HOSPITAL VISIT    ENCOUNTER DATE: 11/7/2023  SITE: [facility]    DATE OF ADMISSION: 10/31/2023  1:05 PM  LENGTH OF STAY: 7 days    CHIEF COMPLAINT   Shellie Atkinson is a 52 y.o. female, seen during daily de [facility] rosa rounds on the inpatient unit.  Shellie Atkinson presented with the chief complaint of psychosis, "I'm not sure why I'm here."    The patient was seen and examined. The chart was reviewed.     Reviewed notes from Rns, SW, and LPN and labs from the last 24 hours.    The patient's case was discussed with the treatment team/care providers today including Rns    Staff reports no behavioral or management issues.     The patient has been non compliant with treatment.    Subjective 11/07/2023      Today, the patient continues to report that she feels well.     Discussed collateral from son, pt states she will call her mother instead so she can stay with her. She continues to deny reports from her children (son and daughter). Discussed options and attempted to explore solutions at length. She continues to refuse treatment with medications despite encouragement.     Behavior is isolative on the U.    She continues to deny all psychiatric symptoms as documented below. She has no insight into her condition. There is no safe disposition at this time as son will not allow her to return to his home.    Delusions remain severe and not improving.     Delusional disorder appears to be the likely diagnosis. She is refusing medications, but she is open to psychotherapy.          Psychiatric ROS (observed, reported, or endorsed/denied):  Depressed mood - No  Interest/pleasure/anhedonia: No  Guilt/hopelessness/worthlessness - No  Changes in Sleep - No  Changes in Appetite - No  Changes in Concentration - No  Changes in Energy - No  PMA/R- No  Suicidal- active/passive ideations - No  Homicidal ideations: active/passive ideations - No    Hallucinations - " No  Delusions - Continuing  Disorganized behavior - No  Disorganized speech - denies  Negative symptoms - No    Elevated mood - denies  Decreased need for sleep - No  Grandiosity - No  Racing thoughts - Yes  Impulsivity - denies  Irritability- No  Increased energy - No  Distractibility - denies  Increase in goal-directed activity or PMA- No    Symptoms of SHARMIN - denies  Symptoms of Panic Disorder- No  Symptoms of PTSD - No    Overall progress: Patient is showing no improvement on the Unit to date        Psychotherapy:  Target symptoms: psychosis  Why chosen therapy is appropriate versus another modality: relevant to diagnosis, evidence based practice  Outcome monitoring methods: self-report, observation  Therapeutic intervention type: supportive psychotherapy  Topics discussed/themes: symptom recognition  The patient's response to the intervention is guarded. The patient's progress toward treatment goals is not progressing.   Duration of intervention: 16 minutes.          Medical ROS  Review of Systems   Constitutional: Negative.  Negative for chills and fever.   HENT: Negative.  Negative for hearing loss.    Eyes: Negative.  Negative for blurred vision and double vision.   Respiratory: Negative.  Negative for shortness of breath.    Cardiovascular: Negative.  Negative for chest pain and palpitations.   Gastrointestinal: Negative.  Negative for constipation, diarrhea, heartburn and nausea.   Genitourinary: Negative.  Negative for dysuria.   Musculoskeletal: Negative.  Negative for back pain and neck pain.   Skin: Negative.  Negative for rash.   Neurological: Negative.  Negative for dizziness and headaches.   Endo/Heme/Allergies: Negative.  Negative for environmental allergies.   Psychiatric/Behavioral:          As noted       PAST MEDICAL HISTORY   Past Medical History:   Diagnosis Date    Insomnia        PSYCHOTROPIC MEDICATIONS   Scheduled Meds:   ARIPiprazole  5 mg Oral Daily    nicotine  1 patch Transdermal Daily  "    Continuous Infusions:  PRN Meds:.acetaminophen, aluminum-magnesium hydroxide-simethicone, benzonatate, benztropine mesylate, hydrOXYzine pamoate, loperamide, OLANZapine **AND** OLANZapine, ondansetron, promethazine    EXAMINATION    VITALS   Vitals:    11/05/23 1923 11/06/23 0731 11/06/23 1918 11/07/23 0749   BP: 130/70 128/61 130/80 122/65   BP Location: Left arm Left arm Left arm Left arm   Patient Position: Sitting Sitting Sitting Sitting   Pulse: 73 63 62 67   Resp: 18 18 18 18   Temp: 98.5 °F (36.9 °C) 98.9 °F (37.2 °C) 99 °F (37.2 °C) 99 °F (37.2 °C)   TempSrc: Oral Oral Oral Oral   SpO2: 97% (!) 94% 98% 97%   Weight:       Height:           Body mass index is 25.19 kg/m².          CONSTITUTIONAL  General Appearance: unremarkable, age appropriate    MUSCULOSKELETAL  Muscle Strength and Tone:no tremor, no tic  Abnormal Involuntary Movements: No  Gait and Station: non-ataxic    PSYCHIATRIC   Level of Consciousness: awake, alert , and oriented  Orientation: person, place, time, and situation  Grooming: Casually dressed and Well groomed  Psychomotor Behavior: normal, cooperative, friendly and cooperative  Speech: normal tone, normal rate, normal pitch, normal volume  Language: grossly intact  Mood: "ok"  Affect: mood congruent, less irritable  Thought Process:  more linear and organized  Associations: intact   Thought Content: DENIES suicidal ideation, DENIES homicidal ideation, and +delusions  Perceptions: denies hallucinations  Memory: Able to recall past events, Remote intact, and Recent intact  Attention:Intact to conversation  Fund of Knowledge: Aware of current events and Vocabulary appropriate   Estimate if Intelligence:  Average based on work/education history, vocabulary and mental status exam  Insight: limited awareness of illness  Judgment: behavior is adequate to circumstances- fair/improving        DIAGNOSTIC TESTING   Laboratory Results  No results found for this or any previous visit (from the " past 24 hour(s)).    MEDICAL DECISION MAKING   ASSESSMENT:     Delusional Disorder  Unspecified Anxiety Disorder     Psychosocial stressors     Nicotine Dependence         PROBLEM LIST AND MANAGEMENT PLANS     Psychosis: pt counseled  -she is currently refusing medications and denying symptoms  -Zyprexa prn  -psychotherapy provided  Offering abilify 2 mg/day- increase to 5 mg po q day- pt refusing   -psychotherapy provided    Anxiety: pt counseled  -vistaril prn  -uncertain if primary vs secondary to paranoia  -psychotherapy provided     Psychosocial stressors: pt counseled   -Sw consulted to assist with resources     Nicotine Dependence: pt counseled  -started/continue nicotine 14 mg patch dermal q day     Discussed diagnosis, risks and benefits of proposed treatment vs alternative treatments vs no treatment, potential side effects of these treatments and the inherent unpredictability of treatment. The patient expresses understanding of the above and displays the capacity to agree with this treatment given said understanding. Patient also agrees that, currently, the benefits outweigh the risks and would like to pursue/continue treatment at this time.    DISCHARGE PLANNING  Expected Disposition Plan: Home or Self Care- pt will be stable for discharge home in 1-2 days    NEED FOR CONTINUED HOSPITALIZATION  Psychiatric illness continues to pose a potential threat to life or bodily function, of self or others, thereby requiring the need for continued inpatient psychiatric hospitalization: Yes, due to: significant psychotic thought disorder, as evidenced by:  Ongoing concerns with grave disability with patient unable to perform basic feeding, hygiene and dressing activities without significant constant support.    Protective inpatient pyschiatric hospitalization required while a safe disposition plan is enacted: Yes    Patient stabilized and ready for discharge from inpatient psychiatric unit: No      STAFF:   Shaun  Gentry PATTERSON MD  Psychiatry

## 2023-11-07 NOTE — PLAN OF CARE
Collateral:   Cameron Lambert, son, [phone]    Other Pertinent Info:   She was feeling like she was being stalked by witches, war locks, her neighbors were doing Mu-ism  She was evicted from her apartment because of paranoia behaviors and trying to attack her neighbors -this is what led to her first hospitalization   She stopped taking her medications last October, she hasn't been on meds since then   She hadn't been acting normal but was saying everything was fine, our lights had went out for a little while she said Avis/Yuliana had control over the light, she said she took chicken bone and bird feather she thought the lady buried in the ground so she went outside and dug hole in the ground   She said the lady spit on our window   She has latched on to this woman Avis/Yuliana and I don't know why she wasn't even a past co worker, they never even held a conversation    She had a burnt bowl in her room it looked like witch craft   She's been having hallucinations about avis/yuliana  She was diagnosed with paranoia schizoaffective  since I was a child   I have recordings of the conversations   She's very good at masking her behaviors, she has worked with mental patients   If you go against her she tends to go in a manic state and gets angry, so hyun been going along with her   After her last hospitalization she came home acting as though she was back taking medications but wasn't   She cant stay with us in that state of mind   I'm not trying to imprison her but   She hides in the room she sits in the room all day, she doesn't want to work or anything  She has lost a lot of weight she claims she's fasting but that's not whats going on   I believe she was participating in it to remove it, she was constant burning things in her room   She said her  is a warlock, he wants to help her but she degrades and insults him  She's pushing everyone away, everyone is her enemy       Cameron sent conversation between his  mother and him to CSW's email

## 2023-11-08 PROCEDURE — 11400000 HC PSYCH PRIVATE ROOM

## 2023-11-08 PROCEDURE — 90833 PR PSYCHOTHERAPY W/PATIENT W/E&M, 30 MIN (ADD ON): ICD-10-PCS | Mod: AF,HB,, | Performed by: STUDENT IN AN ORGANIZED HEALTH CARE EDUCATION/TRAINING PROGRAM

## 2023-11-08 PROCEDURE — 99232 SBSQ HOSP IP/OBS MODERATE 35: CPT | Mod: AF,HB,, | Performed by: STUDENT IN AN ORGANIZED HEALTH CARE EDUCATION/TRAINING PROGRAM

## 2023-11-08 PROCEDURE — 99232 PR SUBSEQUENT HOSPITAL CARE,LEVL II: ICD-10-PCS | Mod: AF,HB,, | Performed by: STUDENT IN AN ORGANIZED HEALTH CARE EDUCATION/TRAINING PROGRAM

## 2023-11-08 PROCEDURE — 90833 PSYTX W PT W E/M 30 MIN: CPT | Mod: AF,HB,, | Performed by: STUDENT IN AN ORGANIZED HEALTH CARE EDUCATION/TRAINING PROGRAM

## 2023-11-08 NOTE — PLAN OF CARE
POC reviewed this shift and is ongoing.  Pt cooperative with staff and  interacts well. Withdrawn with peers. Denies SI, HI, A/V hallucination. No ss of distress.

## 2023-11-08 NOTE — PROGRESS NOTES
"PSYCHIATRY DAILY INPATIENT PROGRESS NOTE  SUBSEQUENT HOSPITAL VISIT    ENCOUNTER DATE: 11/8/2023  SITE: [facility]    DATE OF ADMISSION: 10/31/2023  1:05 PM  LENGTH OF STAY: 8 days    CHIEF COMPLAINT   Shellie Atkinson is a 52 y.o. female, seen during daily de la rosa rounds on the inpatient unit.  Shellie Atkinson presented with the chief complaint of psychosis, "I'm not sure why I'm here."    The patient was seen and examined. The chart was reviewed.     Reviewed notes from Rns, SW, and LPN and labs from the last 24 hours.    The patient's case was discussed with the treatment team/care providers today including Rns    Staff reports no behavioral or management issues.     The patient has been non compliant with treatment.    Subjective 11/08/2023      Today, the patient continues to report that she feels "great... we had group today and I found out a lot of people have yaquelin, that's refreshing."    She continues to deny all psychiatric symptoms as documented below.     Delusions remain severe and not improving.    Insight remains poor.     Delusional disorder appears to be the likely diagnosis. She is refusing medications, but she is open to psychotherapy.    Daughter will come from [place] to pick her up this weekend. No safe disposition until that time given patient's mental condition and lack of housing.        Psychiatric ROS (observed, reported, or endorsed/denied):  Depressed mood - No  Interest/pleasure/anhedonia: No  Guilt/hopelessness/worthlessness - No  Changes in Sleep - No  Changes in Appetite - No  Changes in Concentration - No  Changes in Energy - No  PMA/R- No  Suicidal- active/passive ideations - No  Homicidal ideations: active/passive ideations - No    Hallucinations - No  Delusions - Continuing  Disorganized behavior - No  Disorganized speech - denies  Negative symptoms - No    Elevated mood - denies  Decreased need for sleep - No  Grandiosity - No  Racing thoughts - No  Impulsivity - " denies  Irritability- No  Increased energy - No  Distractibility - denies  Increase in goal-directed activity or PMA- No    Symptoms of SHARMIN - denies  Symptoms of Panic Disorder- No  Symptoms of PTSD - No        Overall progress: Patient is showing mild improvement         Psychotherapy:  Target symptoms: psychosis  Why chosen therapy is appropriate versus another modality: relevant to diagnosis, evidence based practice  Outcome monitoring methods: self-report, observation  Therapeutic intervention type: supportive psychotherapy  Topics discussed/themes: symptom recognition  The patient's response to the intervention is accepting, guarded. The patient's progress toward treatment goals is limited.   Duration of intervention: 17 minutes.          Medical ROS  Review of Systems   Constitutional: Negative.  Negative for chills and fever.   HENT: Negative.  Negative for hearing loss.    Eyes: Negative.  Negative for blurred vision and double vision.   Respiratory: Negative.  Negative for shortness of breath.    Cardiovascular: Negative.  Negative for chest pain and palpitations.   Gastrointestinal: Negative.  Negative for constipation, diarrhea, heartburn and nausea.   Genitourinary: Negative.  Negative for dysuria.   Musculoskeletal: Negative.  Negative for back pain and neck pain.   Skin: Negative.  Negative for rash.   Neurological: Negative.  Negative for dizziness and headaches.   Endo/Heme/Allergies: Negative.  Negative for environmental allergies.   Psychiatric/Behavioral:          As noted       PAST MEDICAL HISTORY   Past Medical History:   Diagnosis Date    Insomnia        PSYCHOTROPIC MEDICATIONS   Scheduled Meds:   ARIPiprazole  5 mg Oral Daily    nicotine  1 patch Transdermal Daily     Continuous Infusions:  PRN Meds:.acetaminophen, aluminum-magnesium hydroxide-simethicone, benzonatate, benztropine mesylate, hydrOXYzine pamoate, loperamide, OLANZapine **AND** OLANZapine, ondansetron,  "promethazine    EXAMINATION    VITALS   Vitals:    11/06/23 1918 11/07/23 0749 11/07/23 1912 11/08/23 0717   BP: 130/80 122/65 111/77 118/79   BP Location: Left arm Left arm Left arm Left arm   Patient Position: Sitting Sitting Sitting Sitting   Pulse: 62 67 76 80   Resp: 18 18 18 18   Temp: 99 °F (37.2 °C) 99 °F (37.2 °C) 98 °F (36.7 °C) 98.9 °F (37.2 °C)   TempSrc: Oral Oral Oral Oral   SpO2: 98% 97% 98% 96%   Weight:       Height:           Body mass index is 25.19 kg/m².          CONSTITUTIONAL  General Appearance: unremarkable, age appropriate    MUSCULOSKELETAL  Muscle Strength and Tone:no tremor, no tic  Abnormal Involuntary Movements: No  Gait and Station: non-ataxic    PSYCHIATRIC   Level of Consciousness: awake, alert , and oriented  Orientation: person, place, time, and situation  Grooming: Casually dressed and Well groomed  Psychomotor Behavior: normal, cooperative, friendly and cooperative  Speech: normal tone, normal rate, normal pitch, normal volume  Language: grossly intact  Mood: "great"  Affect: mood congruent, less irritable  Thought Process:  more linear and organized  Associations: intact   Thought Content: DENIES suicidal ideation, DENIES homicidal ideation, and +delusions  Perceptions: denies hallucinations  Memory: Able to recall past events, Remote intact, and Recent intact  Attention:Intact to conversation  Fund of Knowledge: Aware of current events and Vocabulary appropriate   Estimate if Intelligence:  Average based on work/education history, vocabulary and mental status exam  Insight: limited awareness of illness  Judgment: behavior is adequate to circumstances- fair/improving        DIAGNOSTIC TESTING   Laboratory Results  No results found for this or any previous visit (from the past 24 hour(s)).    MEDICAL DECISION MAKING   ASSESSMENT:     Delusional Disorder  Unspecified Anxiety Disorder     Psychosocial stressors     Nicotine Dependence         PROBLEM LIST AND MANAGEMENT PLANS   "   Psychosis: pt counseled  -she is currently refusing medications and denying symptoms  -Zyprexa prn  -psychotherapy provided  Offering abilify 2 mg/day- increase to 5 mg po q day- pt refusing   -psychotherapy provided    Anxiety: pt counseled  -vistaril prn  -uncertain if primary vs secondary to paranoia  -psychotherapy provided     Psychosocial stressors: pt counseled   -Sw consulted to assist with resources     Nicotine Dependence: pt counseled  -started/continue nicotine 14 mg patch dermal q day     Discussed diagnosis, risks and benefits of proposed treatment vs alternative treatments vs no treatment, potential side effects of these treatments and the inherent unpredictability of treatment. The patient expresses understanding of the above and displays the capacity to agree with this treatment given said understanding. Patient also agrees that, currently, the benefits outweigh the risks and would like to pursue/continue treatment at this time.    DISCHARGE PLANNING  Expected Disposition Plan: Home or Self Care- pt will be stable for discharge home in 1-2 days    NEED FOR CONTINUED HOSPITALIZATION  Psychiatric illness continues to pose a potential threat to life or bodily function, of self or others, thereby requiring the need for continued inpatient psychiatric hospitalization: Yes, due to: significant psychotic thought disorder, as evidenced by:  Ongoing concerns with grave disability with patient unable to perform basic feeding, hygiene and dressing activities without significant constant support.    Protective inpatient pyschiatric hospitalization required while a safe disposition plan is enacted: Yes    Patient stabilized and ready for discharge from inpatient psychiatric unit: No      The patient location is: [facility]    Visit type: audiovisual    Face to Face time with patient: 20  30 minutes of total time spent on the encounter, which includes face to face time and non-face to face time preparing to  see the patient (eg, review of tests), Obtaining and/or reviewing separately obtained history, Documenting clinical information in the electronic or other health record, Independently interpreting results (not separately reported) and communicating results to the patient/family/caregiver, or Care coordination (not separately reported).     Each patient to whom he or she provides medical services by telemedicine is:  (1) informed of the relationship between the physician and patient and the respective role of any other health care provider with respect to management of the patient; and (2) notified that he or she may decline to receive medical services by telemedicine and may withdraw from such care at any time.        STAFF:   Shaun Rinaldi III, MD  Psychiatry

## 2023-11-08 NOTE — PLAN OF CARE
Problem: Adult Behavioral Health Plan of Care  Goal: Plan of Care Review  Outcome: Ongoing, Progressing  Goal: Patient-Specific Goal (Individualization)  Outcome: Ongoing, Progressing  Goal: Adheres to Safety Considerations for Self and Others  Outcome: Ongoing, Progressing  Goal: Absence of New-Onset Illness or Injury  Outcome: Ongoing, Progressing  Goal: Optimized Coping Skills in Response to Life Stressors  Outcome: Ongoing, Progressing  Goal: Develops/Participates in Therapeutic [place] to Support Successful Transition  Outcome: Ongoing, Progressing  Goal: Rounds/Family Conference  Outcome: Ongoing, Progressing     Problem: Activity and Energy Impairment (Anxiety Signs/Symptoms)  Goal: Optimized Energy Level (Anxiety Signs/Symptoms)  Outcome: Ongoing, Progressing     Problem: Cognitive Impairment (Anxiety Signs/Symptoms)  Goal: Optimized Cognitive Function (Anxiety Signs/Symptoms)  Outcome: Ongoing, Progressing     Problem: Mood Impairment (Anxiety Signs/Symptoms)  Goal: Improved Mood Symptoms (Anxiety Signs/Symptoms)  Outcome: Ongoing, Progressing     Problem: Sleep Impairment (Anxiety Signs/Symptoms)  Goal: Improved Sleep (Anxiety Signs/Symptoms)  Outcome: Ongoing, Progressing     Problem: Social, Occupational or Functional Impairment (Anxiety Signs/Symptoms)  Goal: Enhanced Social, Occupational or Functional Skills (Anxiety Signs/Symptoms)  Outcome: Ongoing, Progressing     Problem: Somatic Disturbance (Anxiety Signs/Symptoms)  Goal: Improved Somatic Symptoms (Anxiety Signs/Symptoms)  Outcome: Ongoing, Progressing     Problem: Behavior Regulation Impairment (Psychotic Signs/Symptoms)  Goal: Improved Behavioral Control (Psychotic Signs/Symptoms)  Outcome: Ongoing, Progressing     Problem: Cognitive Impairment (Psychotic Signs/Symptoms)  Goal: Optimal Cognitive Function (Psychotic Signs/Symptoms)  Outcome: Ongoing, Progressing     Problem: Decreased Participation and Engagement (Psychotic  Signs/Symptoms)  Goal: Increased Participation and Engagement (Psychotic Signs/Symptoms)  Outcome: Ongoing, Progressing     Problem: Mood Impairment (Psychotic Signs/Symptoms)  Goal: Improved Mood Symptoms (Psychotic Signs/Symptoms)  Outcome: Ongoing, Progressing     Problem: Psychomotor Impairment (Psychotic Signs/Symptoms)  Goal: Improved Psychomotor Symptoms (Psychotic Signs/Symptoms)  Outcome: Ongoing, Progressing     Problem: Sensory Perception Impairment (Psychotic Signs/Symptoms)  Goal: Decreased Sensory Symptoms (Psychotic Signs/Symptoms)  Outcome: Ongoing, Progressing     Problem: Sleep Disturbance (Psychotic Signs/Symptoms)  Goal: Improved Sleep (Psychotic Signs/Symptoms)  Outcome: Ongoing, Progressing     Problem: Social, Occupational or Functional Impairment (Psychotic Signs/Symptoms)  Goal: Enhanced Social, Occupational or Functional Skills (Psychotic Signs/Symptoms)  Outcome: Ongoing, Progressing     Will continue to monitor.

## 2023-11-08 NOTE — NURSING
POC reviewed this shift and is on going. Patient is withdrawn, depressed, calm, and quiet. Noncompliant with morning medication. Denies Suicidal Ideation, Homicidal Ideation, Auditory Hallucinations, Visual Hallucinations, Tactile Hallucinations, Gustatory Hallucinations, and Delusions. Patient has been isolating in her room all day, but did come out of room to eat lunch.  Saff will continue to monitor pt closely while on the unit.

## 2023-11-09 PROCEDURE — 11400000 HC PSYCH PRIVATE ROOM

## 2023-11-09 PROCEDURE — 90833 PR PSYCHOTHERAPY W/PATIENT W/E&M, 30 MIN (ADD ON): ICD-10-PCS | Mod: SA,HB,, | Performed by: PSYCHIATRY & NEUROLOGY

## 2023-11-09 PROCEDURE — 99232 SBSQ HOSP IP/OBS MODERATE 35: CPT | Mod: SA,HB,, | Performed by: PSYCHIATRY & NEUROLOGY

## 2023-11-09 PROCEDURE — 90833 PSYTX W PT W E/M 30 MIN: CPT | Mod: SA,HB,, | Performed by: PSYCHIATRY & NEUROLOGY

## 2023-11-09 PROCEDURE — 99232 PR SUBSEQUENT HOSPITAL CARE,LEVL II: ICD-10-PCS | Mod: SA,HB,, | Performed by: PSYCHIATRY & NEUROLOGY

## 2023-11-09 NOTE — PROGRESS NOTES
"PSYCHIATRY DAILY INPATIENT PROGRESS NOTE  SUBSEQUENT HOSPITAL VISIT    ENCOUNTER DATE: 11/9/2023  SITE: [facility]    DATE OF ADMISSION: 10/31/2023  1:05 PM  LENGTH OF STAY: 9 days    CHIEF COMPLAINT   Shellie Atkinson is a 52 y.o. female, seen during daily [facility] ro[facility] rounds on the inpatient unit.  Shellie Atkinson presented with the chief complaint of psychosis, "I'm not sure why I'm here."    The patient was seen and examined. The chart was reviewed.     Reviewed notes from Rns, SW, and LPN and labs from the last 24 hours.    The patient's case was discussed with the treatment team/care providers today including Rns    Staff reports no behavioral or management issues.     The patient has been non compliant with treatment.    Subjective 11/09/2023      Pt continues to endorse good sleep, good appetite. Continues to deny any psychiatric symptoms including AVH, depression, SI. Pt demonstrates continuing lack of insight into illness, continues to refuse medications. Reports mood is "great", affect is appropriate, suspected to be somewhat superficial. Patient inquires today about psychotherapy options outpatient. She is specifically interested in telehealth options.         Psychiatric ROS (observed, reported, or endorsed/denied):  Depressed mood - No  Interest/pleasure/anhedonia: No  Guilt/hopelessness/worthlessness - No  Changes in Sleep - No  Changes in Appetite - No  Changes in Concentration - No  Changes in Energy - No  PMA/R- No  Suicidal- active/passive ideations - No  Homicidal ideations: active/passive ideations - No    Hallucinations - No  Delusions - Continuing  Disorganized behavior - No  Disorganized speech - denies  Negative symptoms - No    Elevated mood - denies  Decreased need for sleep - No  Grandiosity - No  Racing thoughts - No  Impulsivity - denies  Irritability- No  Increased energy - No  Distractibility - denies  Increase in goal-directed activity or PMA- No    Symptoms of SHARMIN - " denies  Symptoms of Panic Disorder- No  Symptoms of PTSD - No        Overall progress: Patient is showing mild improvement         Psychotherapy:  Target symptoms: psychosis  Why chosen therapy is appropriate versus another modality: relevant to diagnosis, evidence based practice  Outcome monitoring methods: self-report, observation  Therapeutic intervention type: supportive psychotherapy  Topics discussed/themes: symptom recognition  The patient's response to the intervention is accepting, guarded. The patient's progress toward treatment goals is limited.   Duration of intervention: 17 minutes.          Medical ROS  Review of Systems   Constitutional: Negative.  Negative for chills and fever.   HENT: Negative.  Negative for hearing loss.    Eyes: Negative.  Negative for blurred vision and double vision.   Respiratory: Negative.  Negative for shortness of breath.    Cardiovascular: Negative.  Negative for chest pain and palpitations.   Gastrointestinal: Negative.  Negative for constipation, diarrhea, heartburn and nausea.   Genitourinary: Negative.  Negative for dysuria.   Musculoskeletal: Negative.  Negative for back pain and neck pain.   Skin: Negative.  Negative for rash.   Neurological: Negative.  Negative for dizziness and headaches.   Endo/Heme/Allergies: Negative.  Negative for environmental allergies.   Psychiatric/Behavioral:          As noted       PAST MEDICAL HISTORY   Past Medical History:   Diagnosis Date    Insomnia        PSYCHOTROPIC MEDICATIONS   Scheduled Meds:   ARIPiprazole  5 mg Oral Daily    nicotine  1 patch Transdermal Daily     Continuous Infusions:  PRN Meds:.acetaminophen, aluminum-magnesium hydroxide-simethicone, benzonatate, benztropine mesylate, hydrOXYzine pamoate, loperamide, OLANZapine **AND** OLANZapine, ondansetron, promethazine    EXAMINATION    VITALS   Vitals:    11/07/23 1912 11/08/23 0717 11/08/23 1922 11/09/23 0735   BP: 111/77 118/79 96/66 (!) 110/52   BP Location: Left arm  "Left arm Left arm Left arm   Patient Position: Sitting Sitting Sitting Sitting   Pulse: 76 80 82 78   Resp: 18 18 16 18   Temp: 98 °F (36.7 °C) 98.9 °F (37.2 °C) 98.8 °F (37.1 °C) 99 °F (37.2 °C)   TempSrc: Oral Oral Oral Oral   SpO2: 98% 96% 98% 98%   Weight:       Height:           Body mass index is 25.19 kg/m².          CONSTITUTIONAL  General Appearance: unremarkable, age appropriate    MUSCULOSKELETAL  Muscle Strength and Tone:no tremor, no tic  Abnormal Involuntary Movements: No  Gait and Station: non-ataxic    PSYCHIATRIC   Level of Consciousness: awake, alert , and oriented  Orientation: person, place, time, and situation  Grooming: Casually dressed and Well groomed  Psychomotor Behavior: normal, cooperative, friendly and cooperative  Speech: normal tone, normal rate, normal pitch, normal volume  Language: grossly intact  Mood: "great"  Affect: mood congruent, less irritable, suspect minimization of symptoms  Thought Process:  more linear and organized  Associations: intact   Thought Content: DENIES suicidal ideation, DENIES homicidal ideation, and +delusions  Perceptions: denies hallucinations  Memory: Able to recall past events, Remote intact, and Recent intact  Attention:Intact to conversation  Fund of Knowledge: Aware of current events and Vocabulary appropriate   Estimate if Intelligence:  Average based on work/education history, vocabulary and mental status exam  Insight: limited awareness of illness  Judgment: behavior is adequate to circumstances- fair/improving        DIAGNOSTIC TESTING   Laboratory Results  No results found for this or any previous visit (from the past 24 hour(s)).    MEDICAL DECISION MAKING   ASSESSMENT:     Delusional Disorder  Unspecified Anxiety Disorder     Psychosocial stressors     Nicotine Dependence         PROBLEM LIST AND MANAGEMENT PLANS     Psychosis: pt counseled  -she is currently refusing medications and denying symptoms  -Zyprexa prn  -psychotherapy " provided  Offering abilify 2 mg/day- increase to 5 mg po q day- pt refusing   -psychotherapy provided    Anxiety: pt counseled  -vistaril prn  -uncertain if primary vs secondary to paranoia  -psychotherapy provided     Psychosocial stressors: pt counseled   -Sw consulted to assist with resources     Nicotine Dependence: pt counseled  -started/continue nicotine 14 mg patch dermal q day     Discussed diagnosis, risks and benefits of proposed treatment vs alternative treatments vs no treatment, potential side effects of these treatments and the inherent unpredictability of treatment. The patient expresses understanding of the above and displays the capacity to agree with this treatment given said understanding. Patient also agrees that, currently, the benefits outweigh the risks and would like to pursue/continue treatment at this time.    DISCHARGE PLANNING  Expected Disposition Plan: Home or Self Care- pt will be stable for discharge home in 1-2 days    NEED FOR CONTINUED HOSPITALIZATION  Psychiatric illness continues to pose a potential threat to life or bodily function, of self or others, thereby requiring the need for continued inpatient psychiatric hospitalization: Yes, due to: significant psychotic thought disorder, as evidenced by:  Ongoing concerns with grave disability with patient unable to perform basic feeding, hygiene and dressing activities without significant constant support.    Protective inpatient pyschiatric hospitalization required while a safe disposition plan is enacted: Yes    Patient stabilized and ready for discharge from inpatient psychiatric unit: No             STAFF:   Ghulam Yoon NP  Psychiatry

## 2023-11-09 NOTE — PSYCH
POC reviewed and is ongoing. Pt calm, quiet, and uncooperative with medication. Pt ambulates on unit as needed for meals, otherwise self-isolates to room. Pt focused on discharge, denies H/SI and A/VH. Will continue to monitor pt to ensure health and safety.

## 2023-11-09 NOTE — NURSING
POC reviewed this shift and is ongoing.  Pt sat in the corner of the dining room, calm and quiet.  Had no meds ordered tonight.  Slept well during the night.

## 2023-11-10 PROCEDURE — 90833 PR PSYCHOTHERAPY W/PATIENT W/E&M, 30 MIN (ADD ON): ICD-10-PCS | Mod: SA,HB,, | Performed by: PSYCHIATRY & NEUROLOGY

## 2023-11-10 PROCEDURE — 90833 PSYTX W PT W E/M 30 MIN: CPT | Mod: SA,HB,, | Performed by: PSYCHIATRY & NEUROLOGY

## 2023-11-10 PROCEDURE — 99232 SBSQ HOSP IP/OBS MODERATE 35: CPT | Mod: SA,HB,, | Performed by: PSYCHIATRY & NEUROLOGY

## 2023-11-10 PROCEDURE — 11400000 HC PSYCH PRIVATE ROOM

## 2023-11-10 PROCEDURE — 99232 PR SUBSEQUENT HOSPITAL CARE,LEVL II: ICD-10-PCS | Mod: SA,HB,, | Performed by: PSYCHIATRY & NEUROLOGY

## 2023-11-10 NOTE — PROGRESS NOTES
"PSYCHIATRY DAILY INPATIENT PROGRESS NOTE  SUBSEQUENT HOSPITAL VISIT    ENCOUNTER DATE: 11/10/2023  SITE: [facility]    DATE OF ADMISSION: 10/31/2023  1:05 PM  LENGTH OF STAY: 10 days    CHIEF COMPLAINT   Shellie Atkinson is a 52 y.o. female, seen during daily [facility] rounds on the inpatient unit.  Shellie Atkinson presented with the chief complaint of psychosis, "I'm not sure why I'm here."    The patient was seen and examined. The chart was reviewed.     Reviewed notes from Rns, SW, and LPN and labs from the last 24 hours.    The patient's case was discussed with the treatment team/care providers today including Rns    Staff reports no behavioral or management issues.     The patient has been non compliant with treatment.    Subjective 11/10/2023      Pt continues to endorse good sleep, good appetite. Continues to deny any psychiatric symptoms including AVH, depression, SI. Further discussed outpatient treatment options including telehealth therapy. Pt informed that switching from [place] to [place] medicaid would be necessary if she is planning on living in [place] for an extended period of time. She verbalized understanding.     Pt continues to refuse abilify.     Pt likely discharging tomorrow.         Psychiatric ROS (observed, reported, or endorsed/denied):  Depressed mood - No  Interest/pleasure/anhedonia: No  Guilt/hopelessness/worthlessness - No  Changes in Sleep - No  Changes in Appetite - No  Changes in Concentration - No  Changes in Energy - No  PMA/R- No  Suicidal- active/passive ideations - No  Homicidal ideations: active/passive ideations - No    Hallucinations - No  Delusions - Continuing  Disorganized behavior - No  Disorganized speech - denies  Negative symptoms - No    Elevated mood - denies  Decreased need for sleep - No  Grandiosity - No  Racing thoughts - No  Impulsivity - denies  Irritability- No  Increased energy - No  Distractibility - denies  Increase in goal-directed activity or PMA- " No    Symptoms of SHARMIN - denies  Symptoms of Panic Disorder- No  Symptoms of PTSD - No        Overall progress: Patient is showing mild improvement         Psychotherapy:  Target symptoms: psychosis  Why chosen therapy is appropriate versus another modality: relevant to diagnosis, evidence based practice  Outcome monitoring methods: self-report, observation  Therapeutic intervention type: supportive psychotherapy  Topics discussed/themes: symptom recognition  The patient's response to the intervention is accepting, guarded. The patient's progress toward treatment goals is limited.   Duration of intervention: 17 minutes.          Medical ROS  Review of Systems   Constitutional: Negative.  Negative for chills and fever.   HENT: Negative.  Negative for hearing loss.    Eyes: Negative.  Negative for blurred vision and double vision.   Respiratory: Negative.  Negative for shortness of breath.    Cardiovascular: Negative.  Negative for chest pain and palpitations.   Gastrointestinal: Negative.  Negative for constipation, diarrhea, heartburn and nausea.   Genitourinary: Negative.  Negative for dysuria.   Musculoskeletal: Negative.  Negative for back pain and neck pain.   Skin: Negative.  Negative for rash.   Neurological: Negative.  Negative for dizziness and headaches.   Endo/Heme/Allergies: Negative.  Negative for environmental allergies.   Psychiatric/Behavioral:          As noted       PAST MEDICAL HISTORY   Past Medical History:   Diagnosis Date    Insomnia        PSYCHOTROPIC MEDICATIONS   Scheduled Meds:   ARIPiprazole  5 mg Oral Daily    nicotine  1 patch Transdermal Daily     Continuous Infusions:  PRN Meds:.acetaminophen, aluminum-magnesium hydroxide-simethicone, benzonatate, benztropine mesylate, hydrOXYzine pamoate, loperamide, OLANZapine **AND** OLANZapine, ondansetron, promethazine    EXAMINATION    VITALS   Vitals:    11/08/23 1922 11/09/23 0735 11/09/23 1911 11/10/23 0743   BP: 96/66 (!) 110/52 129/66 119/73  "  BP Location: Left arm Left arm Left arm Left arm   Patient Position: Sitting Sitting Sitting Sitting   Pulse: 82 78 76 75   Resp: 16 18 20 20   Temp: 98.8 °F (37.1 °C) 99 °F (37.2 °C) 98.4 °F (36.9 °C) 97 °F (36.1 °C)   TempSrc: Oral Oral Oral Oral   SpO2: 98% 98% 100% 100%   Weight:       Height:           Body mass index is 25.19 kg/m².          CONSTITUTIONAL  General Appearance: unremarkable, age appropriate    MUSCULOSKELETAL  Muscle Strength and Tone:no tremor, no tic  Abnormal Involuntary Movements: No  Gait and Station: non-ataxic    PSYCHIATRIC   Level of Consciousness: awake, alert , and oriented  Orientation: person, place, time, and situation  Grooming: Casually dressed and Well groomed  Psychomotor Behavior: normal, cooperative, friendly and cooperative  Speech: normal tone, normal rate, normal pitch, normal volume  Language: grossly intact  Mood: "great"  Affect: mood congruent, less irritable, suspect minimization of symptoms  Thought Process:  more linear and organized  Associations: intact   Thought Content: DENIES suicidal ideation, DENIES homicidal ideation, and +delusions  Perceptions: denies hallucinations  Memory: Able to recall past events, Remote intact, and Recent intact  Attention:Intact to conversation  Fund of Knowledge: Aware of current events and Vocabulary appropriate   Estimate if Intelligence:  Average based on work/education history, vocabulary and mental status exam  Insight: limited awareness of illness  Judgment: behavior is adequate to circumstances- fair/improving        DIAGNOSTIC TESTING   Laboratory Results  No results found for this or any previous visit (from the past 24 hour(s)).    MEDICAL DECISION MAKING   ASSESSMENT:     Delusional Disorder  Unspecified Anxiety Disorder     Psychosocial stressors     Nicotine Dependence         PROBLEM LIST AND MANAGEMENT PLANS     Psychosis: pt counseled  -she is currently refusing medications and denying symptoms  -Zyprexa " prn  -psychotherapy provided  Offering abilify 2 mg/day- increase to 5 mg po q day- pt refusing   -psychotherapy provided    Anxiety: pt counseled  -vistaril prn  -uncertain if primary vs secondary to paranoia  -psychotherapy provided     Psychosocial stressors: pt counseled   -Sw consulted to assist with resources     Nicotine Dependence: pt counseled  -started/continue nicotine 14 mg patch dermal q day     Discussed diagnosis, risks and benefits of proposed treatment vs alternative treatments vs no treatment, potential side effects of these treatments and the inherent unpredictability of treatment. The patient expresses understanding of the above and displays the capacity to agree with this treatment given said understanding. Patient also agrees that, currently, the benefits outweigh the risks and would like to pursue/continue treatment at this time.    DISCHARGE PLANNING  Expected Disposition Plan: Home or Self Care- pt will be stable for discharge home in 1-2 days    NEED FOR CONTINUED HOSPITALIZATION  Psychiatric illness continues to pose a potential threat to life or bodily function, of self or others, thereby requiring the need for continued inpatient psychiatric hospitalization: Yes, due to: significant psychotic thought disorder, as evidenced by:  Ongoing concerns with grave disability with patient unable to perform basic feeding, hygiene and dressing activities without significant constant support.    Protective inpatient pyschiatric hospitalization required while a safe disposition plan is enacted: Yes    Patient stabilized and ready for discharge from inpatient psychiatric unit: No             STAFF:   Ghulam Yoon NP  Psychiatry

## 2023-11-10 NOTE — PLAN OF CARE
Patient is anxious and agitated on shift.  Complaining about simple things and challenging with staff.  Patient slept through the night.

## 2023-11-10 NOTE — PLAN OF CARE
POC reviewed this shift and is on going. Patient is withdrawn, depressed, calm, cooperative, and quiet. Denies Suicidal Ideation, Homicidal Ideation, Auditory Hallucinations, Visual Hallucinations, Tactile Hallucinations, Gustatory Hallucinations, and Delusions. Patient isolates in her most of the time. Patient did participate in the group session that was conducted today. Patient is scheduled to be discharged tomorrow. Pt continues to be medication compliant and staff will continue to monitor pt closely while on the unit.

## 2023-11-11 VITALS
DIASTOLIC BLOOD PRESSURE: 57 MMHG | BODY MASS INDEX: 25.08 KG/M2 | SYSTOLIC BLOOD PRESSURE: 115 MMHG | HEIGHT: 66 IN | TEMPERATURE: 99 F | WEIGHT: 156.06 LBS | OXYGEN SATURATION: 99 % | HEART RATE: 77 BPM | RESPIRATION RATE: 18 BRPM

## 2023-11-11 PROCEDURE — 99239 PR HOSPITAL DISCHARGE DAY,>30 MIN: ICD-10-PCS | Mod: AF,HB,, | Performed by: STUDENT IN AN ORGANIZED HEALTH CARE EDUCATION/TRAINING PROGRAM

## 2023-11-11 PROCEDURE — 99239 HOSP IP/OBS DSCHRG MGMT >30: CPT | Mod: AF,HB,, | Performed by: STUDENT IN AN ORGANIZED HEALTH CARE EDUCATION/TRAINING PROGRAM

## 2023-11-11 NOTE — DISCHARGE SUMMARY
"Discharge Summary  Psychiatry    Admit Date: 10/31/2023    Discharge Date and Time:  11/11/2023 10:48 AM    Attending Physician: J Carlos Buckley MD     Discharge Provider: Shaun Rinaldi III    Reason for Admission:    CHIEF COMPLAINT   Shellie Atkinson is a 52 y.o. female with a past psychiatric history of psychosis and insomnia currently admitted to the inpatient unit with the following chief complaint: psychosis, "I'm not sure why I'm here."    HPI   The patient was seen and examined. The chart was reviewed.     The patient presented to the ER on 10/31/2023 . Per staff notes:  -Pt BIB [employer] w/[facility] for psych eval. Per [facility] placed by family, states pt has been having delusions that an old coworker has been placing Confucianist curses on her, has not been taking her medications for over 1 year, placed foil all over the vents in the house to block spirits, believes her Wifi is being hacked. Pt denies all of these accusations. States she has not said any of those things pt does admit she has not taken any medication since February. Denies SI/HI/AV/HV. Pt is calm and cooperative at this time  -Pt BIB [employer] with [facility] for psych eval, Pt has not been taking her medications or eating, not taking care of herself and unwilling to discuss with family  -Shellie Atkinson is a 52 y.o. female with a PMHx of paranoia, insomnia, who presents to the ED via police under [facility] for psychiatric evaluation. Patient comes under [facility] file out by her son. History provided by independent historian, patient's son, who reports patient has been stating that her neighbors and store owners have been doing Confucianist on her and are out to get her. He further endorses she reported they are turning off her lights and wifi. He further endorses she has not been taking her psychiatric medication since she was last discharged for her psychiatric admission a year ago. He notes she has also has not been eating properly. Patient currently " "denies this, and reports she is concerned because her banking information was stolen. No alleviating or exacerbating factors noted. NKDA.   -Patient is a 52yoF with unknown past psychiatric history who presented to the ED on [facility] by son for paranoia. This morning there were difficulties with internet and the lights. Patient says that she was worried that her cell phone was hacked. Patient thinks that her battery drains too fast because she has spyware. Patient says that her accounts are compromised so she cannot apply for disability. Patient denies that she has covered. Patient does think in spiritual warfare. Patient is concerned that she could be labeled. Patient says she worked with [facility] [employer] team in the past. Patient says that they just want to be free of her. Patient says she does not take any medication. She sleeps ok.    Patient endorses "extremely happy and blessed" mood and endorses good sleep and appetite. Patient denies any sober period of sleep deficit associated with grandiosity/irritability, distractibility, impulsivity, racing thoughts, increased activity and talkativeness. The patient denies any current or history of SI/HI or any plan/intent to self-harm or harm others. Denies AH/VH, paranoia. No vocalized delusions.   Collateral:  Leandra Lambert, son  [phone]  Patient had moved in with her son October 10, 2022. Mother lost her housing; had an apartment in 's name. Since October 2022, patient has been living with son. Son knew that patient has been supposed to take medication. Patient has been lying to son. Patient has not been taking medication. Patient has been talking about someone she worked with in the past. She was saying that the woman put Latter-day on her. Son has recordings of the conversations. Son travels for work so he does not see what is happening. Patient is not acting right. Patient has not been eating; patient said that she had been fasting. Patient blamed the " "refrigerator going out on the coworker, either Avis or Evangelina. Accuses the lady of following patient from other medication. This is to another extreme that she is pressed against this woman. Thinks the lights are being powered by the lady. Patient thinks the  is a warlock that interacts with her, too. Accusing the lady of spitting on the windows that was actually a splash of paint. Thinks she is fighting a force that is going against her will. Patient has been isolating herself. Son says that he has recordings.     The patient was medically cleared and admitted to the U.     The patient reports that she was having some stress over suspicious activity on her phone, "I woke up and they sent me here." She denied all symptoms as documented below; however, there are concerns that she is minimizing symptoms. She discussed narratives concerning for paranoid delusions.     She previously had a similar episode in 1/2023- she was admitted to a B[facility] and placed on medication. She is not currently on any medications.    Procedures Performed: * No surgery found *    Hospital Course:    Patient was admitted to the inpatient psychiatry unit after being medically cleared in the ED. Chart and labs were reviewed. The patient was stabilized as follows:      Psychosis: pt counseled  -she is currently refusing medications and denying symptoms  -Zyprexa prn  -psychotherapy provided  Offering abilify 2 mg/day- increase to 5 mg po q day- pt refusing; patient agreeable to outpatient psychotherapy   -psychotherapy provided     Anxiety: pt counseled  -vistaril prn  -uncertain if primary vs secondary to paranoia  -psychotherapy provided     Psychosocial stressors: pt counseled   -Sw consulted to assist with resources     Nicotine Dependence: pt counseled  -started/continue nicotine 14 mg patch dermal q day         During hospitalization, the patient was encouraged to go to both groups and individual counseling. Patient was monitored " for any side effects. A meeting was held with multidisciplinary team prior to discharge and pt's diagnosis, current medications, and follow up were discussed. The patient has been compliant with treatment and can adequately attend to activities of daily living in an independent manner. The patient denies any side effects. The patient denies SI, HI, plan or intent for self harm or harm to others. The patient is no longer a danger to self or others nor gravely disabled disabled. Patient discharged  in stable condition with scheduled outpatient follow up. Patient discharged to care of daughter. Patient is agreeable with outpatient psychotherapy.       Discussed diagnosis, risks and benefits of proposed treatment vs alternative treatments vs no treatment, and potential side effects of these treatments.  The patient expresses understanding of the above and displays the capacity to agree with this treatment given said understanding.  Patient also agrees that, currently, the benefits outweigh the risks and would like to pursue treatment at this time.      Discharge MSE: stated age, casually dressed, well groomed.  No psychomotor agitation or retardation.  No abnormal involuntary movements.  Gait normal.  Speech normal, conversational.  Language fluent English. Mood fine.  Affect normal range, pleasant, euthymic.  Thought process linear.  Associations intact.  Denies suicidal or homicidal ideation.  Denies auditory hallucinations, paranoid ideation, ideas of reference. +Delusions. Memory intact.  Attention intact.  Fund of knowledge intact.  Insight intact.  Judgment intact.  Alert and oriented to person, place, time.      Tobacco Usage:  Is patient a smoker? No  Does patient want prescription for Tobacco Cessation? No  Does patient want counseling for Tobacco Cessation? No    If patient would like to quit, then over the counter nicotine patch could be used. The patient could also follow up with his PCP or psychiatric  provider for other alternatives.     Final Diagnoses:    Principal Problem: Delusional Disorder   Secondary Diagnoses:     Unspecified Anxiety Disorder     Psychosocial stressors     Nicotine Dependence        Labs:  Admission on 10/31/2023   Component Date Value Ref Range Status    Hemoglobin A1C 11/01/2023 5.3  4.0 - 5.6 % Final    Estimated Avg Glucose 11/01/2023 105  68 - 131 mg/dL Final    Cholesterol 11/01/2023 231 (H)  120 - 199 mg/dL Final    Triglycerides 11/01/2023 63  30 - 150 mg/dL Final    HDL 11/01/2023 77 (H)  40 - 75 mg/dL Final    LDL Cholesterol 11/01/2023 141.4  63.0 - 159.0 mg/dL Final    HDL/Cholesterol Ratio 11/01/2023 33.3  20.0 - 50.0 % Final    Total Cholesterol/HDL Ratio 11/01/2023 3.0  2.0 - 5.0 Final    Non-HDL Cholesterol 11/01/2023 154  mg/dL Final   Admission on 10/30/2023, Discharged on 10/31/2023   Component Date Value Ref Range Status    WBC 10/30/2023 6.59  3.90 - 12.70 K/uL Final    RBC 10/30/2023 4.19  4.00 - 5.40 M/uL Final    Hemoglobin 10/30/2023 12.3  12.0 - 16.0 g/dL Final    Hematocrit 10/30/2023 37.4  37.0 - 48.5 % Final    MCV 10/30/2023 89  82 - 98 fL Final    MCH 10/30/2023 29.4  27.0 - 31.0 pg Final    MCHC 10/30/2023 32.9  32.0 - 36.0 g/dL Final    RDW 10/30/2023 13.2  11.5 - 14.5 % Final    Platelets 10/30/2023 433  150 - 450 K/uL Final    MPV 10/30/2023 9.0 (L)  9.2 - 12.9 fL Final    Immature Granulocytes 10/30/2023 0.2  0.0 - 0.5 % Final    Gran # (ANC) 10/30/2023 2.5  1.8 - 7.7 K/uL Final    Immature Grans (Abs) 10/30/2023 0.01  0.00 - 0.04 K/uL Final    Lymph # 10/30/2023 3.3  1.0 - 4.8 K/uL Final    Mono # 10/30/2023 0.5  0.3 - 1.0 K/uL Final    Eos # 10/30/2023 0.3  0.0 - 0.5 K/uL Final    Baso # 10/30/2023 0.05  0.00 - 0.20 K/uL Final    nRBC 10/30/2023 0  0 /100 WBC Final    Gran % 10/30/2023 37.7 (L)  38.0 - 73.0 % Final    Lymph % 10/30/2023 50.2 (H)  18.0 - 48.0 % Final    Mono % 10/30/2023 7.3  4.0 - 15.0 % Final    Eosinophil % 10/30/2023 3.8  0.0 - 8.0  % Final    Basophil % 10/30/2023 0.8  0.0 - 1.9 % Final    Differential Method 10/30/2023 Automated   Final    Sodium 10/30/2023 145  136 - 145 mmol/L Final    Potassium 10/30/2023 3.2 (L)  3.5 - 5.1 mmol/L Final    Chloride 10/30/2023 109  95 - 110 mmol/L Final    CO2 10/30/2023 24  23 - 29 mmol/L Final    Glucose 10/30/2023 109  70 - 110 mg/dL Final    BUN 10/30/2023 8  6 - 20 mg/dL Final    Creatinine 10/30/2023 0.7  0.5 - 1.4 mg/dL Final    Calcium 10/30/2023 9.4  8.7 - 10.5 mg/dL Final    Total Protein 10/30/2023 7.7  6.0 - 8.4 g/dL Final    Albumin 10/30/2023 3.7  3.5 - 5.2 g/dL Final    Total Bilirubin 10/30/2023 0.5  0.1 - 1.0 mg/dL Final    Alkaline Phosphatase 10/30/2023 88  55 - 135 U/L Final    AST 10/30/2023 11  10 - 40 U/L Final    ALT 10/30/2023 8 (L)  10 - 44 U/L Final    eGFR 10/30/2023 >60  >60 mL/min/1.73 m^2 Final    Anion Gap 10/30/2023 12  8 - 16 mmol/L Final    TSH 10/30/2023 0.318 (L)  0.400 - 4.000 uIU/mL Final    Specimen UA 10/30/2023 Urine, Clean Catch   Final    Color, UA 10/30/2023 Yellow  Yellow, Straw, Simi Final    Appearance, UA 10/30/2023 Hazy (A)  Clear Final    pH, UA 10/30/2023 6.0  5.0 - 8.0 Final    Specific Gravity, UA 10/30/2023 1.015  1.005 - 1.030 Final    Protein, UA 10/30/2023 Negative  Negative Final    Glucose, UA 10/30/2023 Negative  Negative Final    Ketones, UA 10/30/2023 Negative  Negative Final    Bilirubin (UA) 10/30/2023 Negative  Negative Final    Occult Blood UA 10/30/2023 1+ (A)  Negative Final    Nitrite, UA 10/30/2023 Negative  Negative Final    Urobilinogen, UA 10/30/2023 Negative  <2.0 EU/dL Final    Leukocytes, UA 10/30/2023 3+ (A)  Negative Final    Benzodiazepines 10/30/2023 Negative  Negative Final    Methadone metabolites 10/30/2023 Negative  Negative Final    Cocaine (Metab.) 10/30/2023 Negative  Negative Final    Opiate Scrn, Ur 10/30/2023 Negative  Negative Final    Barbiturate Screen, Ur 10/30/2023 Negative  Negative Final    Amphetamine Screen,  Ur 10/30/2023 Negative  Negative Final    THC 10/30/2023 Negative  Negative Final    Phencyclidine 10/30/2023 Negative  Negative Final    Creatinine, Urine 10/30/2023 155.5  15.0 - 325.0 mg/dL Final    Toxicology Information 10/30/2023 SEE COMMENT   Final    Alcohol, Serum 10/30/2023 <10  <10 mg/dL Final    Acetaminophen (Tylenol), Serum 10/30/2023 <3.0 (L)  10.0 - 20.0 ug/mL Final    RBC, UA 10/30/2023 6 (H)  0 - 4 /hpf Final    WBC, UA 10/30/2023 26 (H)  0 - 5 /hpf Final    Bacteria 10/30/2023 Occasional  None-Occ /hpf Final    Squam Epithel, UA 10/30/2023 7  /hpf Final    Microscopic Comment 10/30/2023 SEE COMMENT   Final    Urine Culture, Routine 10/30/2023 No significant growth   Final    Free T4 10/30/2023 1.25  0.71 - 1.51 ng/dL Final    POC Preg Test, Ur 10/31/2023 Negative  Negative Final     Acceptable 10/31/2023 Yes   Final         Discharged Condition: stable and improved; not currently a danger to self/others or gravely disabled    Disposition: Home or Self Care    Is patient being discharged on multiple neuroleptics? No    Follow Up/Patient Instructions:     Take all medications as prescribed.  Attend all psychiatric and medical follow up appointments.   Abstain from all drugs and alcohol.  Call the crisis line at: [phone] for help in a crisis and emergent situations or call 411 and Return to ED for any acute worsening of your condition including suicidal or homicidal ideations      Discharge Procedure Orders   Diet Adult Regular     Notify your health care provider if you experience any of the following:  temperature >100.4     Notify your health care provider if you experience any of the following:  persistent nausea and vomiting or diarrhea     Notify your health care provider if you experience any of the following:   Order Comments: Suicidal thoughts, homicidal thoughts, or any other changes in mental status  If you would like immediate help/crisis counseling, please call  [phone] (TALK). Through this toll-free phone number for a network of crisis centers across the country. These centers staff their lines with people who are trained to listen and offer support to people in emotional crisis. If you are in an emergency, please call 911.     Notify your health care provider if you experience any of the following:  increased confusion or weakness     Notify your health care provider if you experience any of the following:  persistent dizziness, light-headedness, or visual disturbances     Activity as tolerated      Follow-up Information       [facility] Follow up on 11/20/2023.    Why: In person appointment at 8:00am for, mental health follow up, addiction screening and follow up, smoking cession appointment  Contact information:  [id number] [place], [place]   [phone]                             Follow up apt: see above      Medications:  Reconciled Home Medications:      Medication List        STOP taking these medications      diazePAM 5 MG tablet  Commonly known as: VALIUM     meclizine 25 mg tablet  Commonly known as: ANTIVERT                Diet: regular     Activity as tolerated    Total time spent discharging patient: 34 minutes    Shaun Rinaldi III, MD  Psychiatry

## 2023-11-11 NOTE — PLAN OF CARE
POC reviewed this shift and is ongoing.   Pt cooperative with staff and withdrawn to her room. Pt on unit for ADL, vs, and snacks. No ss of distress. Pt denies SI, HI, A/V hallucinations. Pt daughter called for area of  for pt d/c. Will continue to monitor for changes and safety.